# Patient Record
Sex: FEMALE | Race: WHITE | NOT HISPANIC OR LATINO | Employment: FULL TIME | URBAN - NONMETROPOLITAN AREA
[De-identification: names, ages, dates, MRNs, and addresses within clinical notes are randomized per-mention and may not be internally consistent; named-entity substitution may affect disease eponyms.]

---

## 2023-10-25 ENCOUNTER — OFFICE VISIT (OUTPATIENT)
Dept: NEUROSURGERY | Facility: CLINIC | Age: 73
End: 2023-10-25
Payer: OTHER MISCELLANEOUS

## 2023-10-25 VITALS — HEIGHT: 65 IN | BODY MASS INDEX: 26.16 KG/M2 | WEIGHT: 157 LBS

## 2023-10-25 DIAGNOSIS — E66.3 OVERWEIGHT WITH BODY MASS INDEX (BMI) OF 26 TO 26.9 IN ADULT: ICD-10-CM

## 2023-10-25 DIAGNOSIS — Z78.9 NON-SMOKER: ICD-10-CM

## 2023-10-25 DIAGNOSIS — M54.16 LUMBAR RADICULOPATHY: ICD-10-CM

## 2023-10-25 DIAGNOSIS — M46.1 SACROILIITIS: Primary | ICD-10-CM

## 2023-10-25 DIAGNOSIS — M43.16 SPONDYLOLISTHESIS OF LUMBAR REGION: ICD-10-CM

## 2023-10-25 PROCEDURE — 99204 OFFICE O/P NEW MOD 45 MIN: CPT | Performed by: PHYSICIAN ASSISTANT

## 2023-10-25 RX ORDER — ACETAMINOPHEN AND CODEINE PHOSPHATE 300; 30 MG/1; MG/1
1 TABLET ORAL EVERY 4 HOURS PRN
Qty: 60 TABLET | Refills: 0 | Status: SHIPPED | OUTPATIENT
Start: 2023-10-25

## 2023-10-25 NOTE — PATIENT INSTRUCTIONS
"PATIENT TO CONTINUE TO FOLLOW UP WITH HER PRIMARY CARE PROVIDER FOR YEARLY PHYSICAL EXAMS TO ENSURE COMPLETE HEALTH MAINTENANCE    BMI for Adults  What is BMI?  Body mass index (BMI) is a number that is calculated from a person's weight and height. BMI can help estimate how much of a person's weight is composed of fat. BMI does not measure body fat directly. Rather, it is an alternative to procedures that directly measure body fat, which can be difficult and expensive.  BMI can help identify people who may be at higher risk for certain medical problems.  What are BMI measurements used for?  BMI is used as a screening tool to identify possible weight problems. It helps determine whether a person is obese, overweight, a healthy weight, or underweight.  BMI is useful for:  Identifying a weight problem that may be related to a medical condition or may increase the risk for medical problems.  Promoting changes, such as changes in diet and exercise, to help reach a healthy weight. BMI screening can be repeated to see if these changes are working.  How is BMI calculated?  BMI involves measuring your weight in relation to your height. Both height and weight are measured, and the BMI is calculated from those numbers. This can be done either in English ([place] or metric measurements. Note that charts and online BMI calculators are available to help you find your BMI quickly and easily without having to do these calculations yourself.  To calculate your BMI in English ([place] measurements:    Measure your weight in pounds (lb).  Multiply the number of pounds by 703.  For example, for a person who weighs 180 lb, multiply that number by 703, which equals 126,540.  Measure your height in inches. Then multiply that number by itself to get a measurement called \"inches squared.\"  For example, for a person who is 70 inches tall, the \"inches squared\" measurement is 70 inches x 70 inches, which equals 4,900 inches squared.  Divide the " "total from step 2 (number of lb x 703) by the total from step 3 (inches squared): 126,540 ÷ 4,900 = 25.8. This is your BMI.  To calculate your BMI in metric measurements:  Measure your weight in kilograms (kg).  Measure your height in meters (m). Then multiply that number by itself to get a measurement called \"meters squared.\"  For example, for a person who is 1.75 m tall, the \"meters squared\" measurement is 1.75 m x 1.75 m, which is equal to 3.1 meters squared.  Divide the number of kilograms (your weight) by the meters squared number. In this example: 70 ÷ 3.1 = 22.6. This is your BMI.  What do the results mean?  BMI charts are used to identify whether you are underweight, normal weight, overweight, or obese. The following guidelines will be used:  Underweight: BMI less than 18.5.  Normal weight: BMI between 18.5 and 24.9.  Overweight: BMI between 25 and 29.9.  Obese: BMI of 30 or above.  Keep these notes in mind:  Weight includes both fat and muscle, so someone with a muscular build, such as an athlete, may have a BMI that is higher than 24.9. In cases like these, BMI is not an accurate measure of body fat.  To determine if excess body fat is the cause of a BMI of 25 or higher, further assessments may need to be done by a health care provider.  BMI is usually interpreted in the same way for men and women.  Where to find more information  For more information about BMI, including tools to quickly calculate your BMI, go to these websites:  Centers for Disease Control and Prevention: www.cdc.gov  American Heart Association: www.heart.org  National Heart, Lung, and Blood [place]: www.nhlbi.nih.gov  Summary  Body mass index (BMI) is a number that is calculated from a person's weight and height.  BMI may help estimate how much of a person's weight is composed of fat. BMI can help identify those who may be at higher risk for certain medical problems.  BMI can be measured using English measurements or metric " "measurements.  BMI charts are used to identify whether you are underweight, normal weight, overweight, or obese.  This information is not intended to replace advice given to you by your health care provider. Make sure you discuss any questions you have with your health care provider.  Document Revised: 05/31/2023 Document Reviewed: 07/17/2020  [employer] Patient Education © 2023 [employer]  DASH Eating Plan  DASH stands for Dietary Approaches to Stop Hypertension. The DASH eating plan is a healthy eating plan that has been shown to:  Reduce high blood pressure (hypertension).  Reduce your risk for type 2 diabetes, heart disease, and stroke.  Help with weight loss.  What are tips for following this plan?  Reading food labels  Check food labels for the amount of salt (sodium) per serving. Choose foods with less than 5 percent of the Daily Value of sodium. Generally, foods with less than 300 milligrams (mg) of sodium per serving fit into this eating plan.  To find whole grains, look for the word \"whole\" as the first word in the ingredient list.  Shopping  Buy products labeled as \"low-sodium\" or \"no salt added.\"  Buy fresh foods. Avoid canned foods and pre-made or frozen meals.  Cooking  Avoid adding salt when cooking. Use salt-free seasonings or herbs instead of table salt or sea salt. Check with your health care provider or pharmacist before using salt substitutes.  Do not warren foods. Cook foods using healthy methods such as baking, boiling, grilling, roasting, and broiling instead.  Cook with heart-healthy oils, such as olive, canola, avocado, soybean, or sunflower oil.  Meal planning    Eat a balanced diet that includes:  4 or more servings of fruits and 4 or more servings of vegetables each day. Try to fill one-half of your plate with fruits and vegetables.  6-8 servings of whole grains each day.  Less than 6 oz (170 g) of lean meat, poultry, or fish each day. A 3-oz (85-g) serving of meat is about the same size as " a deck of cards. One egg equals 1 oz (28 g).  2-3 servings of low-fat dairy each day. One serving is 1 cup (237 mL).  1 serving of nuts, seeds, or beans 5 times each week.  2-3 servings of heart-healthy fats. Healthy fats called omega-3 fatty acids are found in foods such as walnuts, flaxseeds, fortified milks, and eggs. These fats are also found in cold-water fish, such as sardines, salmon, and mackerel.  Limit how much you eat of:  Canned or prepackaged foods.  Food that is high in trans fat, such as some fried foods.  Food that is high in saturated fat, such as fatty meat.  Desserts and other sweets, sugary drinks, and other foods with added sugar.  Full-fat dairy products.  Do not salt foods before eating.  Do not eat more than 4 egg yolks a week.  Try to eat at least 2 vegetarian meals a week.  Eat more home-cooked food and less restaurant, buffet, and fast food.  Lifestyle  When eating at a restaurant, ask that your food be prepared with less salt or no salt, if possible.  If you drink alcohol:  Limit how much you use to:  0-1 drink a day for women who are not pregnant.  0-2 drinks a day for men.  Be aware of how much alcohol is in your drink. In the [place] one drink equals one 12 oz bottle of beer (355 mL), one 5 oz glass of wine (148 mL), or one 1½ oz glass of hard liquor (44 mL).  General information  Avoid eating more than 2,300 mg of salt a day. If you have hypertension, you may need to reduce your sodium intake to 1,500 mg a day.  Work with your health care provider to maintain a healthy body weight or to lose weight. Ask what an ideal weight is for you.  Get at least 30 minutes of exercise that causes your heart to beat faster (aerobic exercise) most days of the week. Activities may include walking, swimming, or biking.  Work with your health care provider or dietitian to adjust your eating plan to your individual calorie needs.  What foods should I eat?  Fruits  All fresh, dried, or frozen fruit. Canned  fruit in natural juice (without added sugar).  Vegetables  Fresh or frozen vegetables (raw, steamed, roasted, or grilled). Low-sodium or reduced-sodium tomato and vegetable juice. Low-sodium or reduced-sodium tomato sauce and tomato paste. Low-sodium or reduced-sodium canned vegetables.  Grains  Whole-grain or whole-wheat bread. Whole-grain or whole-wheat pasta. Brown rice. Oatmeal. Quinoa. Bulgur. Whole-grain and low-sodium cereals. Ashley bread. Low-fat, low-sodium crackers. Whole-wheat flour tortillas.  Meats and other proteins  Skinless chicken or turkey. Ground chicken or turkey. Pork with fat trimmed off. Fish and seafood. Egg whites. Dried beans, peas, or lentils. Unsalted nuts, nut butters, and seeds. Unsalted canned beans. Lean cuts of beef with fat trimmed off. Low-sodium, lean precooked or cured meat, such as sausages or meat loaves.  Dairy  Low-fat (1%) or fat-free (skim) milk. Reduced-fat, low-fat, or fat-free cheeses. Nonfat, low-sodium ricotta or cottage cheese. Low-fat or nonfat yogurt. Low-fat, low-sodium cheese.  Fats and oils  Soft margarine without trans fats. Vegetable oil. Reduced-fat, low-fat, or light mayonnaise and salad dressings (reduced-sodium). Canola, safflower, olive, avocado, soybean, and sunflower oils. Avocado.  Seasonings and condiments  Herbs. Spices. Seasoning mixes without salt.  Other foods  Unsalted popcorn and pretzels. Fat-free sweets.  The items listed above may not be a complete list of foods and beverages you can eat. Contact a dietitian for more information.  What foods should I avoid?  Fruits  Canned fruit in a light or heavy syrup. Fried fruit. Fruit in cream or butter sauce.  Vegetables  Creamed or fried vegetables. Vegetables in a cheese sauce. Regular canned vegetables (not low-sodium or reduced-sodium). Regular canned tomato sauce and paste (not low-sodium or reduced-sodium). Regular tomato and vegetable juice (not low-sodium or reduced-sodium). Pickles.  Olives.  Grains  Baked goods made with fat, such as croissants, muffins, or some breads. Dry pasta or rice meal packs.  Meats and other proteins  Fatty cuts of meat. Ribs. Fried meat. Sal. Bologna, salami, and other precooked or cured meats, such as sausages or meat loaves. Fat from the back of a pig (fatback). Bratwurst. Salted nuts and seeds. Canned beans with added salt. Canned or smoked fish. Whole eggs or egg yolks. Chicken or turkey with skin.  Dairy  Whole or 2% milk, cream, and half-and-half. Whole or full-fat cream cheese. Whole-fat or sweetened yogurt. Full-fat cheese. Nondairy creamers. Whipped toppings. Processed cheese and cheese spreads.  Fats and oils  Butter. Stick margarine. Lard. Shortening. Ghee. Sal fat. Tropical oils, such as coconut, palm kernel, or palm oil.  Seasonings and condiments  Onion salt, garlic salt, seasoned salt, table salt, and sea salt. [facility] sauce. Tartar sauce. Barbecue sauce. Teriyaki sauce. Soy sauce, including reduced-sodium. Steak sauce. Canned and packaged gravies. Fish sauce. Oyster sauce. Cocktail sauce. Store-bought horseradish. Ketchup. Mustard. Meat flavorings and tenderizers. Bouillon cubes. Hot sauces. Pre-made or packaged marinades. Pre-made or packaged taco seasonings. Relishes. Regular salad dressings.  Other foods  Salted popcorn and pretzels.  The items listed above may not be a complete list of foods and beverages you should avoid. Contact a dietitian for more information.  Where to find more information  National Heart, Lung, and Blood [place]: www.nhlbi.nih.gov  American Heart Association: www.heart.org  Academy of Nutrition and Dietetics: www.eatright.org  National Kidney Foundation: www.kidney.org  Summary  The DASH eating plan is a healthy eating plan that has been shown to reduce high blood pressure (hypertension). It may also reduce your risk for type 2 diabetes, heart disease, and stroke.  When on the DASH eating plan, aim to eat more  fresh fruits and vegetables, whole grains, lean proteins, low-fat dairy, and heart-healthy fats.  With the DASH eating plan, you should limit salt (sodium) intake to 2,300 mg a day. If you have hypertension, you may need to reduce your sodium intake to 1,500 mg a day.  Work with your health care provider or dietitian to adjust your eating plan to your individual calorie needs.  This information is not intended to replace advice given to you by your health care provider. Make sure you discuss any questions you have with your health care provider.  Document Revised: 11/20/2020 Document Reviewed: 11/20/2020  [employer] Patient Education © 2023 [employer]
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
## 2024-03-01 ENCOUNTER — OFFICE VISIT (OUTPATIENT)
Dept: NEUROSURGERY | Facility: CLINIC | Age: 74
End: 2024-03-01
Payer: OTHER MISCELLANEOUS

## 2024-03-01 VITALS — BODY MASS INDEX: 26.16 KG/M2 | HEIGHT: 65 IN | WEIGHT: 157 LBS

## 2024-03-01 DIAGNOSIS — Z78.9 NON-SMOKER: ICD-10-CM

## 2024-03-01 DIAGNOSIS — E66.3 OVERWEIGHT (BMI 25.0-29.9): Primary | ICD-10-CM
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
## 2024-04-29 ENCOUNTER — OFFICE VISIT (OUTPATIENT)
Dept: NEUROSURGERY | Facility: CLINIC | Age: 74
End: 2024-04-29
Payer: OTHER MISCELLANEOUS

## 2024-04-29 VITALS — WEIGHT: 157 LBS | HEIGHT: 65 IN | BODY MASS INDEX: 26.16 KG/M2

## 2024-04-29 DIAGNOSIS — Z78.9 NON-SMOKER: ICD-10-CM

## 2024-04-29 DIAGNOSIS — M43.16 SPONDYLOLISTHESIS OF LUMBAR REGION: ICD-10-CM

## 2024-04-29 DIAGNOSIS — E66.3 OVERWEIGHT: Primary | ICD-10-CM

## 2024-04-29 DIAGNOSIS — M46.1 SACROILIITIS: ICD-10-CM

## 2024-04-29 PROCEDURE — 99214 OFFICE O/P EST MOD 30 MIN: CPT | Performed by: NEUROLOGICAL SURGERY

## 2024-04-29 RX ORDER — AMLODIPINE BESYLATE 5 MG/1
TABLET ORAL
COMMUNITY
End: 2024-04-29 | Stop reason: SDUPTHER

## 2024-04-29 RX ORDER — ACETAMINOPHEN AND CODEINE PHOSPHATE 300; 30 MG/1; MG/1
1 TABLET ORAL EVERY 4 HOURS PRN
Qty: 24 TABLET | Refills: 0 | Status: SHIPPED | OUTPATIENT
Start: 2024-04-29 | End: 2024-05-05

## 2024-04-29 RX ORDER — NEBIVOLOL 5 MG/1
2.5 TABLET ORAL
COMMUNITY

## 2024-04-29 RX ORDER — HYDROCHLOROTHIAZIDE 25 MG/1
TABLET ORAL
COMMUNITY

## 2024-04-29 RX ORDER — HYDROCODONE BITARTRATE AND ACETAMINOPHEN 7.5; 325 MG/1; MG/1
TABLET ORAL EVERY 6 HOURS PRN
COMMUNITY
End: 2024-04-29

## 2024-04-29 RX ORDER — THYROID 120 MG/1
TABLET ORAL
COMMUNITY

## 2024-04-29 RX ORDER — GABAPENTIN 300 MG/1
300 CAPSULE ORAL 3 TIMES DAILY
Qty: 90 CAPSULE | Refills: 0 | Status: SHIPPED | OUTPATIENT
Start: 2024-04-29 | End: 2024-05-29

## 2024-04-29 NOTE — PATIENT INSTRUCTIONS
For more information:    Quit Now [place]  1-800-QUIT-NOW  https://[place].quitlogix.org/en-[place]/  Steps to Quit Smoking  Smoking tobacco can be harmful to your health and can affect almost every organ in your body. Smoking puts you, and those around you, at risk for developing many serious chronic diseases. Quitting smoking is difficult, but it is one of the best things that you can do for your health. It is never too late to quit.  What are the benefits of quitting smoking?  When you quit smoking, you lower your risk of developing serious diseases and conditions, such as:  Lung cancer or lung disease, such as COPD.  Heart disease.  Stroke.  Heart attack.  Infertility.  Osteoporosis and bone fractures.  Additionally, symptoms such as coughing, wheezing, and shortness of breath may get better when you quit. You may also find that you get sick less often because your body is stronger at fighting off colds and infections. If you are pregnant, quitting smoking can help to reduce your chances of having a baby of low birth weight.  How do I get ready to quit?  When you decide to quit smoking, create a plan to make sure that you are successful. Before you quit:  Pick a date to quit. Set a date within the next two weeks to give you time to prepare.  Write down the reasons why you are quitting. Keep this list in places where you will see it often, such as on your bathroom mirror or in your car or wallet.  Identify the people, places, things, and activities that make you want to smoke (triggers) and avoid them. Make sure to take these actions:  Throw away all cigarettes at home, at work, and in your car.  Throw away smoking accessories, such as ashtrays and lighters.  Clean your car and make sure to empty the ashtray.  Clean your home, including curtains and carpets.  Tell your family, friends, and coworkers that you are quitting. Support from your loved ones can make quitting easier.  Talk with your health care provider  about your options for quitting smoking.  Find out what treatment options are covered by your health insurance.  What strategies can I use to quit smoking?  Talk with your healthcare provider about different strategies to quit smoking. Some strategies include:  Quitting smoking altogether instead of gradually lessening how much you smoke over a period of time. Research shows that quitting “cold turkey” is more successful than gradually quitting.  Attending in-person counseling to help you build problem-solving skills. You are more likely to have success in quitting if you attend several counseling sessions. Even short sessions of 10 minutes can be effective.  Finding resources and support systems that can help you to quit smoking and remain smoke-free after you quit. These resources are most helpful when you use them often. They can include:  Online chats with a counselor.  Telephone quitlines.  Printed self-help materials.  Support groups or group counseling.  Text messaging programs.  Mobile phone applications.  Taking medicines to help you quit smoking. (If you are pregnant or breastfeeding, talk with your health care provider first.) Some medicines contain nicotine and some do not. Both types of medicines help with cravings, but the medicines that include nicotine help to relieve withdrawal symptoms. Your health care provider may recommend:  Nicotine patches, gum, or lozenges.  Nicotine inhalers or sprays.  Non-nicotine medicine that is taken by mouth.  Talk with your health care provider about combining strategies, such as taking medicines while you are also receiving in-person counseling. Using these two strategies together makes you more likely to succeed in quitting than if you used either strategy on its own.  If you are pregnant or breastfeeding, talk with your health care provider about finding counseling or other support strategies to quit smoking. Do not take medicine to help you quit smoking unless  told to do so by your health care provider.  What things can I do to make it easier to quit?  Quitting smoking might feel overwhelming at first, but there is a lot that you can do to make it easier. Take these important actions:  Reach out to your family and friends and ask that they support and encourage you during this time. Call telephone quitlines, reach out to support groups, or work with a counselor for support.  Ask people who smoke to avoid smoking around you.  Avoid places that trigger you to smoke, such as bars, parties, or smoke-break areas at work.  Spend time around people who do not smoke.  Lessen stress in your life, because stress can be a smoking trigger for some people. To lessen stress, try:  Exercising regularly.  Deep-breathing exercises.  Yoga.  Meditating.  Performing a body scan. This involves closing your eyes, scanning your body from head to toe, and noticing which parts of your body are particularly tense. Purposefully relax the muscles in those areas.  Download or purchase mobile phone or tablet apps (applications) that can help you stick to your quit plan by providing reminders, tips, and encouragement. There are many free apps, such as QuitGuide from the CDC (Centers for Disease Control and Prevention). You can find other support for quitting smoking (smoking cessation) through smokefree.gov and other websites.  How will I feel when I quit smoking?  Within the first 24 hours of quitting smoking, you may start to feel some withdrawal symptoms. These symptoms are usually most noticeable 2-3 days after quitting, but they usually do not last beyond 2-3 weeks. Changes or symptoms that you might experience include:  Mood swings.  Restlessness, anxiety, or irritation.  Difficulty concentrating.  Dizziness.  Strong cravings for sugary foods in addition to nicotine.  Mild weight gain.  Constipation.  Nausea.  Coughing or a sore throat.  Changes in how your medicines work in your body.  A  depressed mood.  Difficulty sleeping (insomnia).  After the first 2-3 weeks of quitting, you may start to notice more positive results, such as:  Improved sense of smell and taste.  Decreased coughing and sore throat.  Slower heart rate.  Lower blood pressure.  Clearer skin.  The ability to breathe more easily.  Fewer sick days.  Quitting smoking is very challenging for most people. Do not get discouraged if you are not successful the first time. Some people need to make many attempts to quit before they achieve long-term success. Do your best to stick to your quit plan, and talk with your health care provider if you have any questions or concerns.  This information is not intended to replace advice given to you by your health care provider. Make sure you discuss any questions you have with your health care provider.  Document Released: 12/12/2002 Document Revised: 08/15/2017 Document Reviewed: 05/03/2016  [employer] Interactive Patient Education © 2017 [employer]  For more information:    Quit Now [place]  1-800-QUIT-NOW  https://[place].quitlogix.org/en-[place]/  Steps to Quit Smoking  Smoking tobacco can be harmful to your health and can affect almost every organ in your body. Smoking puts you, and those around you, at risk for developing many serious chronic diseases. Quitting smoking is difficult, but it is one of the best things that you can do for your health. It is never too late to quit.  What are the benefits of quitting smoking?  When you quit smoking, you lower your risk of developing serious diseases and conditions, such as:  Lung cancer or lung disease, such as COPD.  Heart disease.  Stroke.  Heart attack.  Infertility.  Osteoporosis and bone fractures.  Additionally, symptoms such as coughing, wheezing, and shortness of breath may get better when you quit. You may also find that you get sick less often because your body is stronger at fighting off colds and infections. If you are pregnant, quitting  smoking can help to reduce your chances of having a baby of low birth weight.  How do I get ready to quit?  When you decide to quit smoking, create a plan to make sure that you are successful. Before you quit:  Pick a date to quit. Set a date within the next two weeks to give you time to prepare.  Write down the reasons why you are quitting. Keep this list in places where you will see it often, such as on your bathroom mirror or in your car or wallet.  Identify the people, places, things, and activities that make you want to smoke (triggers) and avoid them. Make sure to take these actions:  Throw away all cigarettes at home, at work, and in your car.  Throw away smoking accessories, such as ashtrays and lighters.  Clean your car and make sure to empty the ashtray.  Clean your home, including curtains and carpets.  Tell your family, friends, and coworkers that you are quitting. Support from your loved ones can make quitting easier.  Talk with your health care provider about your options for quitting smoking.  Find out what treatment options are covered by your health insurance.  What strategies can I use to quit smoking?  Talk with your healthcare provider about different strategies to quit smoking. Some strategies include:  Quitting smoking altogether instead of gradually lessening how much you smoke over a period of time. Research shows that quitting “cold turkey” is more successful than gradually quitting.  Attending in-person counseling to help you build problem-solving skills. You are more likely to have success in quitting if you attend several counseling sessions. Even short sessions of 10 minutes can be effective.  Finding resources and support systems that can help you to quit smoking and remain smoke-free after you quit. These resources are most helpful when you use them often. They can include:  Online chats with a counselor.  Telephone quitlines.  Printed self-help materials.  Support groups or group  counseling.  Text messaging programs.  Mobile phone applications.  Taking medicines to help you quit smoking. (If you are pregnant or breastfeeding, talk with your health care provider first.) Some medicines contain nicotine and some do not. Both types of medicines help with cravings, but the medicines that include nicotine help to relieve withdrawal symptoms. Your health care provider may recommend:  Nicotine patches, gum, or lozenges.  Nicotine inhalers or sprays.  Non-nicotine medicine that is taken by mouth.  Talk with your health care provider about combining strategies, such as taking medicines while you are also receiving in-person counseling. Using these two strategies together makes you more likely to succeed in quitting than if you used either strategy on its own.  If you are pregnant or breastfeeding, talk with your health care provider about finding counseling or other support strategies to quit smoking. Do not take medicine to help you quit smoking unless told to do so by your health care provider.  What things can I do to make it easier to quit?  Quitting smoking might feel overwhelming at first, but there is a lot that you can do to make it easier. Take these important actions:  Reach out to your family and friends and ask that they support and encourage you during this time. Call telephone quitlines, reach out to support groups, or work with a counselor for support.  Ask people who smoke to avoid smoking around you.  Avoid places that trigger you to smoke, such as bars, parties, or smoke-break areas at work.  Spend time around people who do not smoke.  Lessen stress in your life, because stress can be a smoking trigger for some people. To lessen stress, try:  Exercising regularly.  Deep-breathing exercises.  Yoga.  Meditating.  Performing a body scan. This involves closing your eyes, scanning your body from head to toe, and noticing which parts of your body are particularly tense. Purposefully relax  the muscles in those areas.  Download or purchase mobile phone or tablet apps (applications) that can help you stick to your quit plan by providing reminders, tips, and encouragement. There are many free apps, such as QuitGuide from the CDC (Centers for Disease Control and Prevention). You can find other support for quitting smoking (smoking cessation) through smokefree.gov and other websites.  How will I feel when I quit smoking?  Within the first 24 hours of quitting smoking, you may start to feel some withdrawal symptoms. These symptoms are usually most noticeable 2-3 days after quitting, but they usually do not last beyond 2-3 weeks. Changes or symptoms that you might experience include:  Mood swings.  Restlessness, anxiety, or irritation.  Difficulty concentrating.  Dizziness.  Strong cravings for sugary foods in addition to nicotine.  Mild weight gain.  Constipation.  Nausea.  Coughing or a sore throat.  Changes in how your medicines work in your body.  A depressed mood.  Difficulty sleeping (insomnia).  After the first 2-3 weeks of quitting, you may start to notice more positive results, such as:  Improved sense of smell and taste.  Decreased coughing and sore throat.  Slower heart rate.  Lower blood pressure.  Clearer skin.  The ability to breathe more easily.  Fewer sick days.  Quitting smoking is very challenging for most people. Do not get discouraged if you are not successful the first time. Some people need to make many attempts to quit before they achieve long-term success. Do your best to stick to your quit plan, and talk with your health care provider if you have any questions or concerns.  This information is not intended to replace advice given to you by your health care provider. Make sure you discuss any questions you have with your health care provider.  Document Released: 12/12/2002 Document Revised: 08/15/2017 Document Reviewed: 05/03/2016  [employer] Interactive Patient Education © 2017  [employer]

## 2024-04-29 NOTE — PROGRESS NOTES
"    Chief complaint:   Chief Complaint   Patient presents with    Follow-up     Follow up low back and right SI Joint pain        Subjective     HPI: I had a chance to see Roxann today in follow-up.  She did have an SI joint injection however she did not get immediate pain relief however approximately 3 days after the injection she is able to sleep better at night because it did help with this pain.  I do not think we are dealing with SI joint pain based on how she responded to the injections and she clearly has a spondylolisthesis at L4/5.  She is doing fairly well right now and therefore I would hold off on any intervention at least for now the how she does.    Review of Systems      Objective      Vital Signs  Ht 165.1 cm (65\")   Wt 71.2 kg (157 lb)   BMI 26.13 kg/m²     Physical Exam  Neurological:      Mental Status: She is oriented to person, place, and time.      Cranial Nerves: Cranial nerves 2-12 are intact.      Motor: Motor strength is normal.     Gait: Gait is intact.   Psychiatric:         Speech: Speech normal.         Neurologic Exam     Mental Status   Oriented to person, place, and time.   Attention: normal. Concentration: normal.   Speech: speech is normal   Level of consciousness: alert  Knowledge: good.   Normal comprehension.     Cranial Nerves   Cranial nerves II through XII intact.     Motor Exam     Strength   Strength 5/5 throughout.     Sensory Exam   Light touch normal.     Gait, Coordination, and Reflexes     Gait  Gait: normal      Imaging review:   MRI and flexion-extension films of the lumbar spine show a previous fusion L5/S1 with a spondylolisthesis at L4/5 with greater than 3 mm of movement on flexion and extension.        Assessment/Plan:   L4/5 spondylolisthesis    For the fact that she is doing quite well at this time I would like to see her back in 6 weeks to see how long these injections work for her.  Unfortunately nothing else is really helped and therefore if these do not " last we may have to consider a fusion at L4/5.  I look forward to seeing her at her next visit.    Patient is a nonsmoker  The patient's Body mass index is 26.13 kg/m².. BMI is above normal parameters. Recommendations include: continue with current weight loss program    Diagnoses and all orders for this visit:    1. Overweight (Primary)    2. Non-smoker    3. Sacroiliitis  -     acetaminophen-codeine (TYLENOL with CODEINE #3) 300-30 MG per tablet; Take 1 tablet by mouth Every 4 (Four) Hours As Needed for Moderate Pain for up to 6 days.  Dispense: 24 tablet; Refill: 0    4. Spondylolisthesis of lumbar region  -     acetaminophen-codeine (TYLENOL with CODEINE #3) 300-30 MG per tablet; Take 1 tablet by mouth Every 4 (Four) Hours As Needed for Moderate Pain for up to 6 days.  Dispense: 24 tablet; Refill: 0  -     gabapentin (NEURONTIN) 300 MG capsule; Take 1 capsule by mouth 3 (Three) Times a Day for 30 days.  Dispense: 90 capsule; Refill: 0        I discussed the patients findings and my recommendations with patient  Shine Enriquez DO  04/29/24  09:44 CDT

## 2024-06-04 ENCOUNTER — OFFICE VISIT (OUTPATIENT)
Dept: NEUROSURGERY | Facility: CLINIC | Age: 74
End: 2024-06-04
Payer: COMMERCIAL

## 2024-06-04 VITALS — BODY MASS INDEX: 26.16 KG/M2 | WEIGHT: 157 LBS | HEIGHT: 65 IN

## 2024-06-04 DIAGNOSIS — M43.16 SPONDYLOLISTHESIS OF LUMBAR REGION: ICD-10-CM

## 2024-06-04 DIAGNOSIS — E66.3 OVERWEIGHT (BMI 25.0-29.9): Primary | ICD-10-CM

## 2024-06-04 DIAGNOSIS — Z78.9 NON-SMOKER: ICD-10-CM

## 2024-06-04 PROCEDURE — 99214 OFFICE O/P EST MOD 30 MIN: CPT | Performed by: NEUROLOGICAL SURGERY

## 2024-06-04 NOTE — PROGRESS NOTES
"    Chief complaint:   Chief Complaint   Patient presents with    Follow-up     Follow up low back pain radiating into right hip        Subjective     HPI: I had a chance to see Roxann today in follow-up and unfortunately her pain is starting to return.  Based on how she reacted to the sacroiliac joint injection I think we are dealing with steroid effect as it took about 3 days for her to get any relief from the injection and if her SI joint was the main culprit we should have seen pain relief right away when the Marcaine was injected into the SI joint.  I do think it is most likely her L4/5 spondylolisthesis that is causing these problems.  She has tried physical therapy as well as injections and this has been going on for a year and a half now and I do think we are going to have to consider surgical intervention as the pain is getting quite severe    Review of Systems      Objective      Vital Signs  Ht 165.1 cm (65\")   Wt 71.2 kg (157 lb)   BMI 26.13 kg/m²     Physical Exam  Neurological:      Mental Status: She is oriented to person, place, and time.      Cranial Nerves: Cranial nerves 2-12 are intact.      Motor: Motor strength is normal.     Gait: Gait is intact.   Psychiatric:         Speech: Speech normal.         Neurologic Exam     Mental Status   Oriented to person, place, and time.   Attention: normal. Concentration: normal.   Speech: speech is normal   Level of consciousness: alert  Knowledge: good.   Normal comprehension.     Cranial Nerves   Cranial nerves II through XII intact.     Motor Exam     Strength   Strength 5/5 throughout.     Sensory Exam   Light touch normal.     Gait, Coordination, and Reflexes     Gait  Gait: normal      Imaging review:   MRI of the lumbar spine shows previous fusion at L5/S1 with a anterolisthesis at L4/5 with severe bilateral foraminal and lateral recess stenosis consistent with the patient's pain.        Assessment/Plan:   L4/5 spondylolisthesis with radiculopathy " and neurogenic claudication    Given the fact that the patient has been dealing with this for over a year and she is not responding to conservative management I did offer her a transforaminal lumbar interbody fusion at L4/5 to stabilize this level and hopefully give her some relief.  I did explain to her the risks and benefits of the procedure at length and she would like to proceed.  We will work on getting her medically cleared and on the operative schedule at our first opening.    Patient is a nonsmoker  The patient's Body mass index is 26.13 kg/m².. BMI is above normal parameters. Recommendations include: continue with current weight loss program    Diagnoses and all orders for this visit:    1. Overweight (BMI 25.0-29.9) (Primary)    2. Non-smoker    3. Spondylolisthesis of lumbar region  -     Case Request; Standing  -     ceFAZolin (ANCEF) 3,000 mg in sodium chloride 0.9 % 100 mL IVPB  -     Case Request    Other orders  -     Follow Anesthesia Guidelines / Protocol; Future  -     Follow Anesthesia Guidelines / Protocol; Standing  -     Verify NPO Status; Standing  -     SCD (Sequential Compression Device) - Place on Patient in Pre-Op; Standing  -     POC Glucose Once; Standing  -     Verify / Perform Chlorhexidine Skin Prep; Standing  -     Provide NPO Instructions to Patient; Future  -     Chlorhexidine Skin Prep; Future  -     Obtain Informed Consent; Future        I discussed the patients findings and my recommendations with patient  Shine Enriquez DO  06/04/24  10:27 CDT

## 2024-06-27 ENCOUNTER — TELEPHONE (OUTPATIENT)
Dept: NEUROSURGERY | Facility: CLINIC | Age: 74
End: 2024-06-27
Payer: COMMERCIAL

## 2024-06-27 NOTE — TELEPHONE ENCOUNTER
CALLED PT TO NOTIFY OF SURGERY ON 8/13/24. NO ANSWER, LEFT VOICE MAIL WITH DATES AND TIMES OF SURGERY AND PRE OP.     IF PT CALLS BACK OK TO TRANSFER TO [facility] IF NECESSARY.

## 2024-07-29 ENCOUNTER — PRE-ADMISSION TESTING (OUTPATIENT)
Dept: PREADMISSION TESTING | Facility: HOSPITAL | Age: 74
End: 2024-07-29
Payer: OTHER MISCELLANEOUS

## 2024-07-29 VITALS
WEIGHT: 160.05 LBS | HEIGHT: 64 IN | DIASTOLIC BLOOD PRESSURE: 78 MMHG | RESPIRATION RATE: 20 BRPM | HEART RATE: 72 BPM | SYSTOLIC BLOOD PRESSURE: 135 MMHG | BODY MASS INDEX: 27.33 KG/M2 | OXYGEN SATURATION: 97 %

## 2024-07-29 LAB
ANION GAP SERPL CALCULATED.3IONS-SCNC: 8 MMOL/L (ref 5–15)
BUN SERPL-MCNC: 13 MG/DL (ref 8–23)
BUN/CREAT SERPL: 20.3 (ref 7–25)
CALCIUM SPEC-SCNC: 9.4 MG/DL (ref 8.6–10.5)
CHLORIDE SERPL-SCNC: 103 MMOL/L (ref 98–107)
CO2 SERPL-SCNC: 27 MMOL/L (ref 22–29)
CREAT SERPL-MCNC: 0.64 MG/DL (ref 0.57–1)
DEPRECATED RDW RBC AUTO: 42.3 FL (ref 37–54)
EGFRCR SERPLBLD CKD-EPI 2021: 94 ML/MIN/1.73
ERYTHROCYTE [DISTWIDTH] IN BLOOD BY AUTOMATED COUNT: 12.8 % (ref 12.3–15.4)
GLUCOSE SERPL-MCNC: 133 MG/DL (ref 65–99)
HCT VFR BLD AUTO: 37.3 % (ref 34–46.6)
HGB BLD-MCNC: 12 G/DL (ref 12–15.9)
MCH RBC QN AUTO: 29.3 PG (ref 26.6–33)
MCHC RBC AUTO-ENTMCNC: 32.2 G/DL (ref 31.5–35.7)
MCV RBC AUTO: 91 FL (ref 79–97)
PLATELET # BLD AUTO: 337 10*3/MM3 (ref 140–450)
PMV BLD AUTO: 9.8 FL (ref 6–12)
POTASSIUM SERPL-SCNC: 4.3 MMOL/L (ref 3.5–5.2)
RBC # BLD AUTO: 4.1 10*6/MM3 (ref 3.77–5.28)
SODIUM SERPL-SCNC: 138 MMOL/L (ref 136–145)
WBC NRBC COR # BLD AUTO: 12.76 10*3/MM3 (ref 3.4–10.8)

## 2024-07-29 PROCEDURE — 85027 COMPLETE CBC AUTOMATED: CPT

## 2024-07-29 PROCEDURE — 93005 ELECTROCARDIOGRAM TRACING: CPT

## 2024-07-29 PROCEDURE — 36415 COLL VENOUS BLD VENIPUNCTURE: CPT

## 2024-07-29 PROCEDURE — 80048 BASIC METABOLIC PNL TOTAL CA: CPT

## 2024-07-29 RX ORDER — HYDROCODONE BITARTRATE AND ACETAMINOPHEN 5; 325 MG/1; MG/1
1 TABLET ORAL EVERY 8 HOURS PRN
COMMUNITY
Start: 2024-07-15

## 2024-07-29 RX ORDER — EMPAGLIFLOZIN 25 MG/1
1 TABLET, FILM COATED ORAL DAILY
COMMUNITY
Start: 2024-07-25 | End: 2024-10-23

## 2024-07-29 NOTE — DISCHARGE INSTRUCTIONS
Preparing for Surgery  Follow these instructions before the procedure:  Several days or weeks before your procedure      Ask your health care provider about:  Changing or stopping your regular medicines. This is especially important if you are taking diabetes medicines or blood thinners.  Taking medicines such as aspirin and ibuprofen. These medicines can thin your blood. Do not take these medicines unless your health care provider tells you to take them.  Taking over-the-counter medicines, vitamins, herbs, and supplements.    Contact your surgeon if you:  Develop a fever of more than 100.4°F (38°C) or other feelings of illness during the 48 hours before your surgery.  Have symptoms that get worse.  Have questions or concerns about your surgery.  If you are going home the same day of your surgery you will need to arrange for a responsible adult, age 18 years old or older, to drive you home from the hospital and stay with you for 24 hours. Verification of the  will be made prior to any procedure requiring sedation. You may not go home in a taxi or any form of public transportation by yourself.     Day before your procedure  Medication(s) you need to stop the day before your surgery:DO NOT TAKE LISINOPRIL FOR 24 HOURS PRIOR TO SURGERY    24 hours before your procedure DO NOT drink alcoholic beverages or smoke.  24 hours before your procedure STOP taking Erectile Dysfunction medication (i.e.,Cialis, Viagra)   You may be asked to shower with a germ-killing soap.  Day of your procedure   You may take the following medication(s) the morning of surgery with a sip of water: GABAPENTIN, METOPROLOL, OMEPRAZOLE, HYDROCODONE (NORCO)      8 hours before your procedure STOP all food, any dairy products, and full liquids. This includes hard candy, chewing gum or mints. This is extremely important to prevent serious complications.     Up to 2 hours before your scheduled arrival time, you may have clear liquids no cream,  powder, or pulp of any kind. Safe options are water, black coffee, plain tea, soda, Gatorade/Powerade, clear broth, apple juice.    2 hours before your scheduled arrival time, STOP drinking clear liquids.    You may need to take another shower with a germ-killing soap before you leave home in the morning. Do not use perfumes, colognes, or body lotions.  Wear comfortable loose-fitting clothing.  Remove all jewelry including body piercing and rings, dark colored nail polish, and make up prior to arrival at the hospital. Leave all valuables at home.   Bring your hearing aids if you rely on them.  Do not wear contact lenses. If you wear eyeglasses remember to bring a case to store them in while you are in surgery.  Do not use denture adhesives since you will be asked to remove them during your surgery.    You do not need to bring your home medications into the hospital.   Bring your sleep apnea device with you on the day of your surgery (if this applies to you).  If you wear portable oxygen, bring it with you.   If you are staying overnight, you may bring a bag of items you may need such as slippers, robe and a change of clothes for your discharge. You may want to leave these items in the car until you are ready for them since your family will take your belongings when you leave the pre-operative area.  Arrive at the hospital as scheduled by the office. You will be asked to arrive 2 hours prior to your surgery time in order to prepare for your procedure.  When you arrive at the hospital  Go to the registration desk located at the main entrance of the hospital.  After registration is completed, you will be given a beeper and a sticker sheet. Take the stickers to Outpatient Surgery and place in the tray at the end of the desk to notify the staff that you have arrived and registered.   Return to the lobby to wait. You are not always called back according to the time of arrival but rather the time your doctor will be  ready.  When your beeper lights up and vibrates proceed through the double doors, under the stairs, and a member of the Outpatient Surgery staff will escort you to your preoperative room.     How to Use Chlorhexidine Before Surgery  Chlorhexidine gluconate (CHG) is a germ-killing (antiseptic) solution that is used to clean the skin. It can get rid of the bacteria that normally live on the skin and can keep them away for about 24 hours. To clean your skin with CHG, you may be given:  A CHG solution to use in the shower or as part of a sponge bath.  A prepackaged cloth that contains CHG.  Cleaning your skin with CHG may help lower the risk for infection:  While you are staying in the intensive care unit of the hospital.  If you have a vascular access, such as a central line, to provide short-term or long-term access to your veins.  If you have a catheter to drain urine from your bladder.  If you are on a ventilator. A ventilator is a machine that helps you breathe by moving air in and out of your lungs.  After surgery.  What are the risks?  Risks of using CHG include:  A skin reaction.  Hearing loss, if CHG gets in your ears and you have a perforated eardrum.  Eye injury, if CHG gets in your eyes and is not rinsed out.  The CHG product catching fire.  Make sure that you avoid smoking and flames after applying CHG to your skin.  Do not use CHG:  If you have a chlorhexidine allergy or have previously reacted to chlorhexidine.  On babies younger than 2 months of age.  How to use CHG solution  Use CHG only as told by your health care provider, and follow the instructions on the label.  Use the full amount of CHG as directed. Usually, this is one bottle.  During a shower    Follow these steps when using CHG solution during a shower (unless your health care provider gives you different instructions):  Start the shower.  Use your normal soap and shampoo to wash your face and hair.  Turn off the shower or move out of the  shower stream.  Pour the CHG onto a clean washcloth. Do not use any type of brush or rough-edged sponge.  Starting at your neck, lather your body down to your toes. Make sure you follow these instructions:  If you will be having surgery, pay special attention to the part of your body where you will be having surgery. Scrub this area for at least 1 minute.  Do not use CHG on your head or face. If the solution gets into your ears or eyes, rinse them well with water.  Avoid your genital area.  Avoid any areas of skin that have broken skin, cuts, or scrapes.  Scrub your back and under your arms. Make sure to wash skin folds.  Let the lather sit on your skin for 1-2 minutes or as long as told by your health care provider.  Thoroughly rinse your entire body in the shower. Make sure that all body creases and crevices are rinsed well.  Dry off with a clean towel. Do not put any substances on your body afterward--such as powder, lotion, or perfume--unless you are told to do so by your health care provider. Only use lotions that are recommended by the .  Put on clean clothes or pajamas.  If it is the night before your surgery, sleep in clean sheets.     During a sponge bath  Follow these steps when using CHG solution during a sponge bath (unless your health care provider gives you different instructions):  Use your normal soap and shampoo to wash your face and hair.  Pour the CHG onto a clean washcloth.  Starting at your neck, lather your body down to your toes. Make sure you follow these instructions:  If you will be having surgery, pay special attention to the part of your body where you will be having surgery. Scrub this area for at least 1 minute.  Do not use CHG on your head or face. If the solution gets into your ears or eyes, rinse them well with water.  Avoid your genital area.  Avoid any areas of skin that have broken skin, cuts, or scrapes.  Scrub your back and under your arms. Make sure to wash skin  folds.  Let the lather sit on your skin for 1-2 minutes or as long as told by your health care provider.  Using a different clean, wet washcloth, thoroughly rinse your entire body. Make sure that all body creases and crevices are rinsed well.  Dry off with a clean towel. Do not put any substances on your body afterward--such as powder, lotion, or perfume--unless you are told to do so by your health care provider. Only use lotions that are recommended by the .  Put on clean clothes or pajamas.  If it is the night before your surgery, sleep in clean sheets.  How to use CHG prepackaged cloths  Only use CHG cloths as told by your health care provider, and follow the instructions on the label.  Use the CHG cloth on clean, dry skin.  Do not use the CHG cloth on your head or face unless your health care provider tells you to.  When washing with the CHG cloth:  Avoid your genital area.  Avoid any areas of skin that have broken skin, cuts, or scrapes.  Before surgery    Follow these steps when using a CHG cloth to clean before surgery (unless your health care provider gives you different instructions):  Using the CHG cloth, vigorously scrub the part of your body where you will be having surgery. Scrub using a back-and-forth motion for 3 minutes. The area on your body should be completely wet with CHG when you are done scrubbing.  Do not rinse. Discard the cloth and let the area air-dry. Do not put any substances on the area afterward, such as powder, lotion, or perfume.  Put on clean clothes or pajamas.  If it is the night before your surgery, sleep in clean sheets.     For general bathing  Follow these steps when using CHG cloths for general bathing (unless your health care provider gives you different instructions).  Use a separate CHG cloth for each area of your body. Make sure you wash between any folds of skin and between your fingers and toes. Wash your body in the following order, switching to a new cloth  after each step:  The front of your neck, shoulders, and chest.  Both of your arms, under your arms, and your hands.  Your stomach and groin area, avoiding the genitals.  Your right leg and foot.  Your left leg and foot.  The back of your neck, your back, and your buttocks.  Do not rinse. Discard the cloth and let the area air-dry. Do not put any substances on your body afterward--such as powder, lotion, or perfume--unless you are told to do so by your health care provider. Only use lotions that are recommended by the .  Put on clean clothes or pajamas.  Contact a health care provider if:  Your skin gets irritated after scrubbing.  You have questions about using your solution or cloth.  You swallow any chlorhexidine. Call your local poison control center (1-345.817.2681 in the [place]  Get help right away if:  Your eyes itch badly, or they become very red or swollen.  Your skin itches badly and is red or swollen.  Your hearing changes.  You have trouble seeing.  You have swelling or tingling in your mouth or throat.  You have trouble breathing.  These symptoms may represent a serious problem that is an emergency. Do not wait to see if the symptoms will go away. Get medical help right away. Call your local emergency services (246 in the [place] Do not drive yourself to the hospital.  Summary  Chlorhexidine gluconate (CHG) is a germ-killing (antiseptic) solution that is used to clean the skin. Cleaning your skin with CHG may help to lower your risk for infection.  You may be given CHG to use for bathing. It may be in a bottle or in a prepackaged cloth to use on your skin. Carefully follow your health care provider's instructions and the instructions on the product label.  Do not use CHG if you have a chlorhexidine allergy.  Contact your health care provider if your skin gets irritated after scrubbing.  This information is not intended to replace advice given to you by your health care provider. Make sure you  discuss any questions you have with your health care provider.  Document Revised: 04/17/2023 Document Reviewed: 02/28/2022  [employer]vier Patient Education © 2023 [employer]

## 2024-08-02 LAB
QT INTERVAL: 382 MS
QTC INTERVAL: 424 MS

## 2024-08-09 ENCOUNTER — TELEPHONE (OUTPATIENT)
Dept: NEUROSURGERY | Facility: CLINIC | Age: 74
End: 2024-08-09
Payer: COMMERCIAL

## 2024-08-09 NOTE — TELEPHONE ENCOUNTER
Called and spoke with pt and let her know that her surgery has been cancelled for 8/13/24 at this time as we are still waiting on the workers comp authorization. Pt voiced understanding.

## 2024-09-20 ENCOUNTER — TELEPHONE (OUTPATIENT)
Dept: NEUROSURGERY | Facility: CLINIC | Age: 74
End: 2024-09-20
Payer: COMMERCIAL

## 2024-10-21 ENCOUNTER — PRE-ADMISSION TESTING (OUTPATIENT)
Dept: PREADMISSION TESTING | Facility: HOSPITAL | Age: 74
End: 2024-10-21
Payer: OTHER MISCELLANEOUS

## 2024-10-21 VITALS
RESPIRATION RATE: 16 BRPM | SYSTOLIC BLOOD PRESSURE: 118 MMHG | BODY MASS INDEX: 28.4 KG/M2 | OXYGEN SATURATION: 95 % | HEART RATE: 67 BPM | WEIGHT: 160.27 LBS | DIASTOLIC BLOOD PRESSURE: 66 MMHG | HEIGHT: 63 IN

## 2024-10-21 LAB
ANION GAP SERPL CALCULATED.3IONS-SCNC: 10 MMOL/L (ref 5–15)
BUN SERPL-MCNC: 17 MG/DL (ref 8–23)
BUN/CREAT SERPL: 17.3 (ref 7–25)
CALCIUM SPEC-SCNC: 9.3 MG/DL (ref 8.6–10.5)
CHLORIDE SERPL-SCNC: 100 MMOL/L (ref 98–107)
CO2 SERPL-SCNC: 29 MMOL/L (ref 22–29)
CREAT SERPL-MCNC: 0.98 MG/DL (ref 0.57–1)
DEPRECATED RDW RBC AUTO: 42.6 FL (ref 37–54)
EGFRCR SERPLBLD CKD-EPI 2021: 61.5 ML/MIN/1.73
ERYTHROCYTE [DISTWIDTH] IN BLOOD BY AUTOMATED COUNT: 12.8 % (ref 12.3–15.4)
GLUCOSE SERPL-MCNC: 196 MG/DL (ref 65–99)
HCT VFR BLD AUTO: 39.3 % (ref 34–46.6)
HGB BLD-MCNC: 12.7 G/DL (ref 12–15.9)
MCH RBC QN AUTO: 29.1 PG (ref 26.6–33)
MCHC RBC AUTO-ENTMCNC: 32.3 G/DL (ref 31.5–35.7)
MCV RBC AUTO: 90.1 FL (ref 79–97)
PLATELET # BLD AUTO: 288 10*3/MM3 (ref 140–450)
PMV BLD AUTO: 9.9 FL (ref 6–12)
POTASSIUM SERPL-SCNC: 4.4 MMOL/L (ref 3.5–5.2)
RBC # BLD AUTO: 4.36 10*6/MM3 (ref 3.77–5.28)
SODIUM SERPL-SCNC: 139 MMOL/L (ref 136–145)
WBC NRBC COR # BLD AUTO: 12.12 10*3/MM3 (ref 3.4–10.8)

## 2024-10-21 PROCEDURE — 85027 COMPLETE CBC AUTOMATED: CPT

## 2024-10-21 PROCEDURE — 80048 BASIC METABOLIC PNL TOTAL CA: CPT

## 2024-10-21 PROCEDURE — 36415 COLL VENOUS BLD VENIPUNCTURE: CPT

## 2024-10-21 NOTE — DISCHARGE INSTRUCTIONS
Preparing for Surgery  Follow these instructions before the procedure:  Several days or weeks before your procedure      Ask your health care provider about:  Changing or stopping your regular medicines. This is especially important if you are taking diabetes medicines or blood thinners.  Taking medicines such as aspirin and ibuprofen. These medicines can thin your blood. Do not take these medicines unless your health care provider tells you to take them.  Taking over-the-counter medicines, vitamins, herbs, and supplements.    Contact your surgeon if you:  Develop a fever of more than 100.4°F (38°C) or other feelings of illness during the 48 hours before your surgery.  Have symptoms that get worse.  Have questions or concerns about your surgery.  If you are going home the same day of your surgery you will need to arrange for a responsible adult, age 18 years old or older, to drive you home from the hospital and stay with you for 24 hours. Verification of the  will be made prior to any procedure requiring sedation. You may not go home in a taxi or any form of public transportation by yourself.     Day before your procedure  Medication(s) you need to stop the day before your surgery:LISINOPRIL    24 hours before your procedure DO NOT drink alcoholic beverages or smoke.  24 hours before your procedure STOP taking Erectile Dysfunction medication (i.e.,Cialis, Viagra)   You may be asked to shower with a germ-killing soap.  Day of your procedure   You may take the following medication(s) the morning of surgery with a sip of water: OMEPRAZOLE, LOPRESSOR, GABAPENTIN      8 hours before your scheduled arrival time, STOP all food, any dairy products, and full liquids. This includes hard candy, chewing gum or mints. This is extremely important to prevent serious complications.     Up to 2 hours before your scheduled arrival time, you may have clear liquids no cream, powder, or pulp of any kind. Safe options are water,  black coffee, plain tea, soda, Gatorade/Powerade, clear broth, apple juice.    2 hours before your scheduled arrival time, STOP drinking clear liquids.    You may need to take another shower with a germ-killing soap before you leave home in the morning. Do not use perfumes, colognes, or body lotions.  Wear comfortable loose-fitting clothing.  Remove all jewelry including body piercing and rings, dark colored nail polish, and make up prior to arrival at the hospital. Leave all valuables at home.   Bring your hearing aids if you rely on them.  Do not wear contact lenses. If you wear eyeglasses remember to bring a case to store them in while you are in surgery.  Do not use denture adhesives since you will be asked to remove them during your surgery.    You do not need to bring your home medications into the hospital.   Bring your sleep apnea device with you on the day of your surgery (if this applies to you).  If you wear portable oxygen, bring it with you.   If you are staying overnight, you may bring a bag of items you may need such as slippers, robe and a change of clothes for your discharge. You may want to leave these items in the car until you are ready for them since your family will take your belongings when you leave the pre-operative area.  Arrive at the hospital as scheduled by the office. You will be asked to arrive 2 hours prior to your surgery time in order to prepare for your procedure.  When you arrive at the hospital  Go to the registration desk located at the main entrance of the hospital.  After registration is completed, you will be given a beeper and a sticker sheet. Take the stickers to Outpatient Surgery and place in the tray at the end of the desk to notify the staff that you have arrived and registered.   Return to the lobby to wait. You are not always called back according to the time of arrival but rather the time your doctor will be ready.  When your beeper lights up and vibrates proceed  through the double doors, under the stairs, and a member of the Outpatient Surgery staff will escort you to your preoperative room.   How to Use Chlorhexidine Before Surgery  Chlorhexidine gluconate (CHG) is a germ-killing (antiseptic) solution that is used to clean the skin. It can get rid of the bacteria that normally live on the skin and can keep them away for about 24 hours. To clean your skin with CHG, you may be given:  A CHG solution to use in the shower or as part of a sponge bath.  A prepackaged cloth that contains CHG.  Cleaning your skin with CHG may help lower the risk for infection:  While you are staying in the intensive care unit of the hospital.  If you have a vascular access, such as a central line, to provide short-term or long-term access to your veins.  If you have a catheter to drain urine from your bladder.  If you are on a ventilator. A ventilator is a machine that helps you breathe by moving air in and out of your lungs.  After surgery.  What are the risks?  Risks of using CHG include:  A skin reaction.  Hearing loss, if CHG gets in your ears and you have a perforated eardrum.  Eye injury, if CHG gets in your eyes and is not rinsed out.  The CHG product catching fire.  Make sure that you avoid smoking and flames after applying CHG to your skin.  Do not use CHG:  If you have a chlorhexidine allergy or have previously reacted to chlorhexidine.  On babies younger than 2 months of age.  How to use CHG solution  Use CHG only as told by your health care provider, and follow the instructions on the label.  Use the full amount of CHG as directed. Usually, this is one bottle.  During a shower    Follow these steps when using CHG solution during a shower (unless your health care provider gives you different instructions):  Start the shower.  Use your normal soap and shampoo to wash your face and hair.  Turn off the shower or move out of the shower stream.  Pour the CHG onto a clean washcloth. Do not  use any type of brush or rough-edged sponge.  Starting at your neck, lather your body down to your toes. Make sure you follow these instructions:  If you will be having surgery, pay special attention to the part of your body where you will be having surgery. Scrub this area for at least 1 minute.  Do not use CHG on your head or face. If the solution gets into your ears or eyes, rinse them well with water.  Avoid your genital area.  Avoid any areas of skin that have broken skin, cuts, or scrapes.  Scrub your back and under your arms. Make sure to wash skin folds.  Let the lather sit on your skin for 1-2 minutes or as long as told by your health care provider.  Thoroughly rinse your entire body in the shower. Make sure that all body creases and crevices are rinsed well.  Dry off with a clean towel. Do not put any substances on your body afterward--such as powder, lotion, or perfume--unless you are told to do so by your health care provider. Only use lotions that are recommended by the .  Put on clean clothes or pajamas.  If it is the night before your surgery, sleep in clean sheets.     During a sponge bath  Follow these steps when using CHG solution during a sponge bath (unless your health care provider gives you different instructions):  Use your normal soap and shampoo to wash your face and hair.  Pour the CHG onto a clean washcloth.  Starting at your neck, lather your body down to your toes. Make sure you follow these instructions:  If you will be having surgery, pay special attention to the part of your body where you will be having surgery. Scrub this area for at least 1 minute.  Do not use CHG on your head or face. If the solution gets into your ears or eyes, rinse them well with water.  Avoid your genital area.  Avoid any areas of skin that have broken skin, cuts, or scrapes.  Scrub your back and under your arms. Make sure to wash skin folds.  Let the lather sit on your skin for 1-2 minutes or as  long as told by your health care provider.  Using a different clean, wet washcloth, thoroughly rinse your entire body. Make sure that all body creases and crevices are rinsed well.  Dry off with a clean towel. Do not put any substances on your body afterward--such as powder, lotion, or perfume--unless you are told to do so by your health care provider. Only use lotions that are recommended by the .  Put on clean clothes or pajamas.  If it is the night before your surgery, sleep in clean sheets.  How to use CHG prepackaged cloths  Only use CHG cloths as told by your health care provider, and follow the instructions on the label.  Use the CHG cloth on clean, dry skin.  Do not use the CHG cloth on your head or face unless your health care provider tells you to.  When washing with the CHG cloth:  Avoid your genital area.  Avoid any areas of skin that have broken skin, cuts, or scrapes.  Before surgery    Follow these steps when using a CHG cloth to clean before surgery (unless your health care provider gives you different instructions):  Using the CHG cloth, vigorously scrub the part of your body where you will be having surgery. Scrub using a back-and-forth motion for 3 minutes. The area on your body should be completely wet with CHG when you are done scrubbing.  Do not rinse. Discard the cloth and let the area air-dry. Do not put any substances on the area afterward, such as powder, lotion, or perfume.  Put on clean clothes or pajamas.  If it is the night before your surgery, sleep in clean sheets.     For general bathing  Follow these steps when using CHG cloths for general bathing (unless your health care provider gives you different instructions).  Use a separate CHG cloth for each area of your body. Make sure you wash between any folds of skin and between your fingers and toes. Wash your body in the following order, switching to a new cloth after each step:  The front of your neck, shoulders, and  chest.  Both of your arms, under your arms, and your hands.  Your stomach and groin area, avoiding the genitals.  Your right leg and foot.  Your left leg and foot.  The back of your neck, your back, and your buttocks.  Do not rinse. Discard the cloth and let the area air-dry. Do not put any substances on your body afterward--such as powder, lotion, or perfume--unless you are told to do so by your health care provider. Only use lotions that are recommended by the .  Put on clean clothes or pajamas.  Contact a health care provider if:  Your skin gets irritated after scrubbing.  You have questions about using your solution or cloth.  You swallow any chlorhexidine. Call your local poison control center (1-382.161.7364 in the [place]  Get help right away if:  Your eyes itch badly, or they become very red or swollen.  Your skin itches badly and is red or swollen.  Your hearing changes.  You have trouble seeing.  You have swelling or tingling in your mouth or throat.  You have trouble breathing.  These symptoms may represent a serious problem that is an emergency. Do not wait to see if the symptoms will go away. Get medical help right away. Call your local emergency services (998 in the [place] Do not drive yourself to the hospital.  Summary  Chlorhexidine gluconate (CHG) is a germ-killing (antiseptic) solution that is used to clean the skin. Cleaning your skin with CHG may help to lower your risk for infection.  You may be given CHG to use for bathing. It may be in a bottle or in a prepackaged cloth to use on your skin. Carefully follow your health care provider's instructions and the instructions on the product label.  Do not use CHG if you have a chlorhexidine allergy.  Contact your health care provider if your skin gets irritated after scrubbing.  This information is not intended to replace advice given to you by your health care provider. Make sure you discuss any questions you have with your health care  provider.  Document Revised: 04/17/2023 Document Reviewed: 02/28/2022  [employer]vier Patient Education © 2023 [employer]

## 2024-11-12 ENCOUNTER — ANESTHESIA (OUTPATIENT)
Dept: PERIOP | Facility: HOSPITAL | Age: 74
End: 2024-11-12
Payer: OTHER MISCELLANEOUS

## 2024-11-12 ENCOUNTER — APPOINTMENT (OUTPATIENT)
Dept: GENERAL RADIOLOGY | Facility: HOSPITAL | Age: 74
End: 2024-11-12
Payer: OTHER MISCELLANEOUS

## 2024-11-12 ENCOUNTER — ANESTHESIA EVENT (OUTPATIENT)
Dept: PERIOP | Facility: HOSPITAL | Age: 74
End: 2024-11-12
Payer: OTHER MISCELLANEOUS

## 2024-11-12 ENCOUNTER — HOSPITAL ENCOUNTER (OUTPATIENT)
Facility: HOSPITAL | Age: 74
Discharge: HOME | End: 2024-11-14
Attending: NEUROLOGICAL SURGERY | Admitting: NEUROLOGICAL SURGERY
Payer: OTHER MISCELLANEOUS

## 2024-11-12 DIAGNOSIS — M43.16 SPONDYLOLISTHESIS OF LUMBAR REGION: ICD-10-CM

## 2024-11-12 DIAGNOSIS — Z74.09 IMPAIRED MOBILITY: Primary | ICD-10-CM

## 2024-11-12 PROBLEM — M43.10 SPONDYLOLISTHESIS: Status: ACTIVE | Noted: 2024-11-12

## 2024-11-12 LAB
GLUCOSE BLDC GLUCOMTR-MCNC: 178 MG/DL (ref 70–130)
GLUCOSE BLDC GLUCOMTR-MCNC: 215 MG/DL (ref 70–130)
GLUCOSE BLDC GLUCOMTR-MCNC: 218 MG/DL (ref 70–130)
GLUCOSE BLDC GLUCOMTR-MCNC: 263 MG/DL (ref 70–130)
GLUCOSE BLDC GLUCOMTR-MCNC: 266 MG/DL (ref 70–130)

## 2024-11-12 PROCEDURE — 25010000002 MIDAZOLAM PER 1MG: Performed by: ANESTHESIOLOGY

## 2024-11-12 PROCEDURE — C1713 ANCHOR/SCREW BN/BN,TIS/BN: HCPCS | Performed by: NEUROLOGICAL SURGERY

## 2024-11-12 PROCEDURE — 25010000002 BUPIVACAINE 0.25 % SOLUTION: Performed by: NEUROLOGICAL SURGERY

## 2024-11-12 PROCEDURE — 25010000002 ONDANSETRON PER 1 MG: Performed by: NURSE ANESTHETIST, CERTIFIED REGISTERED

## 2024-11-12 PROCEDURE — G0378 HOSPITAL OBSERVATION PER HR: HCPCS

## 2024-11-12 PROCEDURE — C1769 GUIDE WIRE: HCPCS | Performed by: NEUROLOGICAL SURGERY

## 2024-11-12 PROCEDURE — 63710000001 INSULIN REGULAR HUMAN PER 5 UNITS: Performed by: ANESTHESIOLOGY

## 2024-11-12 PROCEDURE — 72100 X-RAY EXAM L-S SPINE 2/3 VWS: CPT

## 2024-11-12 PROCEDURE — 25010000002 LIDOCAINE PF 2% 2 % SOLUTION: Performed by: NURSE ANESTHETIST, CERTIFIED REGISTERED

## 2024-11-12 PROCEDURE — 25810000003 LACTATED RINGERS PER 1000 ML: Performed by: NEUROLOGICAL SURGERY

## 2024-11-12 PROCEDURE — 63052 LAM FACETC/FRMT ARTHRD LUM 1: CPT | Performed by: NEUROLOGICAL SURGERY

## 2024-11-12 PROCEDURE — 22853 INSJ BIOMECHANICAL DEVICE: CPT | Performed by: NEUROLOGICAL SURGERY

## 2024-11-12 PROCEDURE — 25010000002 DROPERIDOL PER 5 MG: Performed by: ANESTHESIOLOGY

## 2024-11-12 PROCEDURE — 25010000002 KETOROLAC TROMETHAMINE PER 15 MG: Performed by: NEUROLOGICAL SURGERY

## 2024-11-12 PROCEDURE — 25010000002 CEFAZOLIN PER 500 MG: Performed by: NEUROLOGICAL SURGERY

## 2024-11-12 PROCEDURE — 25010000002 PROPOFOL 10 MG/ML EMULSION: Performed by: NURSE ANESTHETIST, CERTIFIED REGISTERED

## 2024-11-12 PROCEDURE — 22840 INSERT SPINE FIXATION DEVICE: CPT | Performed by: NEUROLOGICAL SURGERY

## 2024-11-12 PROCEDURE — 25010000002 VANCOMYCIN 1 G RECONSTITUTED SOLUTION 1 EACH VIAL: Performed by: NEUROLOGICAL SURGERY

## 2024-11-12 PROCEDURE — 82948 REAGENT STRIP/BLOOD GLUCOSE: CPT

## 2024-11-12 PROCEDURE — 22630 ARTHRD PST TQ 1NTRSPC LUM: CPT | Performed by: NEUROLOGICAL SURGERY

## 2024-11-12 PROCEDURE — 76000 FLUOROSCOPY <1 HR PHYS/QHP: CPT

## 2024-11-12 PROCEDURE — 25010000002 FENTANYL CITRATE (PF) 250 MCG/5ML SOLUTION: Performed by: NURSE ANESTHETIST, CERTIFIED REGISTERED

## 2024-11-12 PROCEDURE — 25010000002 FENTANYL CITRATE (PF) 50 MCG/ML SOLUTION: Performed by: ANESTHESIOLOGY

## 2024-11-12 PROCEDURE — 25010000002 HYDROMORPHONE PER 4 MG: Performed by: ANESTHESIOLOGY

## 2024-11-12 DEVICE — SCREW 54840017545 CN MAS 7.5X45 CC
Type: IMPLANTABLE DEVICE | Site: SPINE LUMBAR | Status: FUNCTIONAL
Brand: CD HORIZON® SPINAL SYSTEM

## 2024-11-12 DEVICE — SPACER 84333414 ADAPTIX 34MM X 14MM
Type: IMPLANTABLE DEVICE | Site: SPINE LUMBAR | Status: FUNCTIONAL
Brand: ADAPTIX™ INTERBODY SYSTEM WITH TITAN NANOLOCK™ SURFACE TECHNOLOGY

## 2024-11-12 DEVICE — DBM T43103 2.5CC GRAFTON PUTTY
Type: IMPLANTABLE DEVICE | Site: SPINE LUMBAR | Status: FUNCTIONAL
Brand: GRAFTON®AND GRAFTON PLUS®DEMINERALIZED BONE MATRIX (DBM)

## 2024-11-12 DEVICE — HEMOST ABS SURGIFOAM SZ100 8X12 10MM: Type: IMPLANTABLE DEVICE | Site: SPINE LUMBAR | Status: FUNCTIONAL

## 2024-11-12 DEVICE — BIOLOGIC 7600110 85 BTCP 15 HA 10CC VIAL
Type: IMPLANTABLE DEVICE | Site: SPINE LUMBAR | Status: FUNCTIONAL
Brand: MASTERGRAFT® GRANULES

## 2024-11-12 RX ORDER — FLUMAZENIL 0.1 MG/ML
0.2 INJECTION INTRAVENOUS AS NEEDED
Status: DISCONTINUED | OUTPATIENT
Start: 2024-11-12 | End: 2024-11-12 | Stop reason: HOSPADM

## 2024-11-12 RX ORDER — DEXMEDETOMIDINE HYDROCHLORIDE 4 UG/ML
INJECTION, SOLUTION INTRAVENOUS AS NEEDED
Status: DISCONTINUED | OUTPATIENT
Start: 2024-11-12 | End: 2024-11-12 | Stop reason: SURG

## 2024-11-12 RX ORDER — SODIUM CHLORIDE, SODIUM LACTATE, POTASSIUM CHLORIDE, CALCIUM CHLORIDE 600; 310; 30; 20 MG/100ML; MG/100ML; MG/100ML; MG/100ML
1000 INJECTION, SOLUTION INTRAVENOUS CONTINUOUS
Status: DISCONTINUED | OUTPATIENT
Start: 2024-11-12 | End: 2024-11-12

## 2024-11-12 RX ORDER — LISINOPRIL 20 MG/1
40 TABLET ORAL DAILY
Status: DISCONTINUED | OUTPATIENT
Start: 2024-11-12 | End: 2024-11-14 | Stop reason: HOSPADM

## 2024-11-12 RX ORDER — OXYCODONE AND ACETAMINOPHEN 7.5; 325 MG/1; MG/1
1 TABLET ORAL EVERY 6 HOURS PRN
Status: DISCONTINUED | OUTPATIENT
Start: 2024-11-12 | End: 2024-11-14 | Stop reason: HOSPADM

## 2024-11-12 RX ORDER — MIDAZOLAM HYDROCHLORIDE 2 MG/2ML
0.5 INJECTION, SOLUTION INTRAMUSCULAR; INTRAVENOUS
Status: DISCONTINUED | OUTPATIENT
Start: 2024-11-12 | End: 2024-11-12 | Stop reason: HOSPADM

## 2024-11-12 RX ORDER — CYCLOBENZAPRINE HCL 10 MG
10 TABLET ORAL 3 TIMES DAILY
Status: DISCONTINUED | OUTPATIENT
Start: 2024-11-12 | End: 2024-11-14 | Stop reason: HOSPADM

## 2024-11-12 RX ORDER — SODIUM CHLORIDE, SODIUM LACTATE, POTASSIUM CHLORIDE, CALCIUM CHLORIDE 600; 310; 30; 20 MG/100ML; MG/100ML; MG/100ML; MG/100ML
100 INJECTION, SOLUTION INTRAVENOUS CONTINUOUS
Status: DISCONTINUED | OUTPATIENT
Start: 2024-11-12 | End: 2024-11-12

## 2024-11-12 RX ORDER — CEPHALEXIN 500 MG/1
500 CAPSULE ORAL EVERY 8 HOURS SCHEDULED
Status: COMPLETED | OUTPATIENT
Start: 2024-11-12 | End: 2024-11-13

## 2024-11-12 RX ORDER — SODIUM CHLORIDE 0.9 % (FLUSH) 0.9 %
3 SYRINGE (ML) INJECTION EVERY 12 HOURS SCHEDULED
Status: DISCONTINUED | OUTPATIENT
Start: 2024-11-12 | End: 2024-11-12 | Stop reason: HOSPADM

## 2024-11-12 RX ORDER — LEVOTHYROXINE SODIUM 88 UG/1
88 TABLET ORAL
Status: DISCONTINUED | OUTPATIENT
Start: 2024-11-12 | End: 2024-11-14 | Stop reason: HOSPADM

## 2024-11-12 RX ORDER — MAGNESIUM HYDROXIDE 1200 MG/15ML
LIQUID ORAL AS NEEDED
Status: DISCONTINUED | OUTPATIENT
Start: 2024-11-12 | End: 2024-11-12 | Stop reason: HOSPADM

## 2024-11-12 RX ORDER — BUPIVACAINE HYDROCHLORIDE 2.5 MG/ML
INJECTION, SOLUTION INFILTRATION; PERINEURAL AS NEEDED
Status: DISCONTINUED | OUTPATIENT
Start: 2024-11-12 | End: 2024-11-12 | Stop reason: HOSPADM

## 2024-11-12 RX ORDER — FENTANYL CITRATE 50 UG/ML
50 INJECTION, SOLUTION INTRAMUSCULAR; INTRAVENOUS
Status: COMPLETED | OUTPATIENT
Start: 2024-11-12 | End: 2024-11-12

## 2024-11-12 RX ORDER — HYDROMORPHONE HYDROCHLORIDE 1 MG/ML
0.5 INJECTION, SOLUTION INTRAMUSCULAR; INTRAVENOUS; SUBCUTANEOUS
Status: DISCONTINUED | OUTPATIENT
Start: 2024-11-12 | End: 2024-11-12 | Stop reason: HOSPADM

## 2024-11-12 RX ORDER — EMPAGLIFLOZIN 25 MG/1
25 TABLET, FILM COATED ORAL DAILY
COMMUNITY
Start: 2024-10-22

## 2024-11-12 RX ORDER — LIDOCAINE HYDROCHLORIDE 20 MG/ML
INJECTION, SOLUTION EPIDURAL; INFILTRATION; INTRACAUDAL; PERINEURAL AS NEEDED
Status: DISCONTINUED | OUTPATIENT
Start: 2024-11-12 | End: 2024-11-12 | Stop reason: SURG

## 2024-11-12 RX ORDER — NALOXONE HCL 0.4 MG/ML
0.04 VIAL (ML) INJECTION AS NEEDED
Status: DISCONTINUED | OUTPATIENT
Start: 2024-11-12 | End: 2024-11-12 | Stop reason: HOSPADM

## 2024-11-12 RX ORDER — SODIUM CHLORIDE 9 MG/ML
40 INJECTION, SOLUTION INTRAVENOUS AS NEEDED
Status: DISCONTINUED | OUTPATIENT
Start: 2024-11-12 | End: 2024-11-12 | Stop reason: HOSPADM

## 2024-11-12 RX ORDER — EPHEDRINE SULFATE 50 MG/ML
INJECTION INTRAVENOUS AS NEEDED
Status: DISCONTINUED | OUTPATIENT
Start: 2024-11-12 | End: 2024-11-12 | Stop reason: SURG

## 2024-11-12 RX ORDER — FENTANYL CITRATE 50 UG/ML
INJECTION, SOLUTION INTRAMUSCULAR; INTRAVENOUS AS NEEDED
Status: DISCONTINUED | OUTPATIENT
Start: 2024-11-12 | End: 2024-11-12 | Stop reason: SURG

## 2024-11-12 RX ORDER — SODIUM CHLORIDE 0.9 % (FLUSH) 0.9 %
10 SYRINGE (ML) INJECTION AS NEEDED
Status: DISCONTINUED | OUTPATIENT
Start: 2024-11-12 | End: 2024-11-12 | Stop reason: HOSPADM

## 2024-11-12 RX ORDER — METOPROLOL TARTRATE 25 MG/1
25 TABLET, FILM COATED ORAL DAILY
Status: DISCONTINUED | OUTPATIENT
Start: 2024-11-13 | End: 2024-11-14 | Stop reason: HOSPADM

## 2024-11-12 RX ORDER — HYDROCHLOROTHIAZIDE 25 MG/1
25 TABLET ORAL DAILY
Status: DISCONTINUED | OUTPATIENT
Start: 2024-11-13 | End: 2024-11-14 | Stop reason: HOSPADM

## 2024-11-12 RX ORDER — SODIUM CHLORIDE 0.9 % (FLUSH) 0.9 %
3-10 SYRINGE (ML) INJECTION AS NEEDED
Status: DISCONTINUED | OUTPATIENT
Start: 2024-11-12 | End: 2024-11-12 | Stop reason: HOSPADM

## 2024-11-12 RX ORDER — ONDANSETRON 2 MG/ML
4 INJECTION INTRAMUSCULAR; INTRAVENOUS
Status: DISCONTINUED | OUTPATIENT
Start: 2024-11-12 | End: 2024-11-12 | Stop reason: HOSPADM

## 2024-11-12 RX ORDER — GABAPENTIN 300 MG/1
300 CAPSULE ORAL 3 TIMES DAILY
Status: DISCONTINUED | OUTPATIENT
Start: 2024-11-12 | End: 2024-11-14 | Stop reason: HOSPADM

## 2024-11-12 RX ORDER — DROPERIDOL 2.5 MG/ML
0.62 INJECTION, SOLUTION INTRAMUSCULAR; INTRAVENOUS ONCE AS NEEDED
Status: DISCONTINUED | OUTPATIENT
Start: 2024-11-12 | End: 2024-11-12 | Stop reason: HOSPADM

## 2024-11-12 RX ORDER — ATRACURIUM BESYLATE 10 MG/ML
INJECTION, SOLUTION INTRAVENOUS AS NEEDED
Status: DISCONTINUED | OUTPATIENT
Start: 2024-11-12 | End: 2024-11-12 | Stop reason: SURG

## 2024-11-12 RX ORDER — LABETALOL HYDROCHLORIDE 5 MG/ML
5 INJECTION, SOLUTION INTRAVENOUS
Status: DISCONTINUED | OUTPATIENT
Start: 2024-11-12 | End: 2024-11-12 | Stop reason: HOSPADM

## 2024-11-12 RX ORDER — OXYCODONE AND ACETAMINOPHEN 10; 325 MG/1; MG/1
1 TABLET ORAL EVERY 4 HOURS PRN
Status: DISCONTINUED | OUTPATIENT
Start: 2024-11-12 | End: 2024-11-12 | Stop reason: HOSPADM

## 2024-11-12 RX ORDER — DROPERIDOL 2.5 MG/ML
0.62 INJECTION, SOLUTION INTRAMUSCULAR; INTRAVENOUS ONCE AS NEEDED
Status: COMPLETED | OUTPATIENT
Start: 2024-11-12 | End: 2024-11-12

## 2024-11-12 RX ORDER — ATORVASTATIN CALCIUM 40 MG/1
40 TABLET, FILM COATED ORAL DAILY
Status: DISCONTINUED | OUTPATIENT
Start: 2024-11-13 | End: 2024-11-14 | Stop reason: HOSPADM

## 2024-11-12 RX ORDER — ONDANSETRON 2 MG/ML
INJECTION INTRAMUSCULAR; INTRAVENOUS AS NEEDED
Status: DISCONTINUED | OUTPATIENT
Start: 2024-11-12 | End: 2024-11-12 | Stop reason: SURG

## 2024-11-12 RX ORDER — ACETAMINOPHEN 500 MG
1000 TABLET ORAL ONCE
Status: COMPLETED | OUTPATIENT
Start: 2024-11-12 | End: 2024-11-12

## 2024-11-12 RX ORDER — KETOROLAC TROMETHAMINE 30 MG/ML
15 INJECTION, SOLUTION INTRAMUSCULAR; INTRAVENOUS EVERY 6 HOURS PRN
Status: DISCONTINUED | OUTPATIENT
Start: 2024-11-12 | End: 2024-11-14 | Stop reason: HOSPADM

## 2024-11-12 RX ORDER — LIDOCAINE HYDROCHLORIDE 10 MG/ML
0.5 INJECTION, SOLUTION EPIDURAL; INFILTRATION; INTRACAUDAL; PERINEURAL ONCE AS NEEDED
Status: DISCONTINUED | OUTPATIENT
Start: 2024-11-12 | End: 2024-11-12 | Stop reason: HOSPADM

## 2024-11-12 RX ORDER — PANTOPRAZOLE SODIUM 40 MG/1
40 TABLET, DELAYED RELEASE ORAL
Status: DISCONTINUED | OUTPATIENT
Start: 2024-11-13 | End: 2024-11-14 | Stop reason: HOSPADM

## 2024-11-12 RX ORDER — SODIUM CHLORIDE 0.9 % (FLUSH) 0.9 %
3 SYRINGE (ML) INJECTION AS NEEDED
Status: DISCONTINUED | OUTPATIENT
Start: 2024-11-12 | End: 2024-11-12 | Stop reason: HOSPADM

## 2024-11-12 RX ORDER — PROPOFOL 10 MG/ML
VIAL (ML) INTRAVENOUS AS NEEDED
Status: DISCONTINUED | OUTPATIENT
Start: 2024-11-12 | End: 2024-11-12 | Stop reason: SURG

## 2024-11-12 RX ORDER — AMLODIPINE BESYLATE 5 MG/1
5 TABLET ORAL DAILY
Status: DISCONTINUED | OUTPATIENT
Start: 2024-11-13 | End: 2024-11-14 | Stop reason: HOSPADM

## 2024-11-12 RX ORDER — IBUPROFEN 600 MG/1
600 TABLET, FILM COATED ORAL EVERY 6 HOURS PRN
Status: DISCONTINUED | OUTPATIENT
Start: 2024-11-12 | End: 2024-11-12 | Stop reason: HOSPADM

## 2024-11-12 RX ADMIN — FENTANYL CITRATE 500 MCG: 50 INJECTION, SOLUTION INTRAMUSCULAR; INTRAVENOUS at 08:10

## 2024-11-12 RX ADMIN — MIDAZOLAM HYDROCHLORIDE 0.5 MG: 1 INJECTION, SOLUTION INTRAMUSCULAR; INTRAVENOUS at 07:23

## 2024-11-12 RX ADMIN — GABAPENTIN 300 MG: 300 CAPSULE ORAL at 16:41

## 2024-11-12 RX ADMIN — EMPAGLIFLOZIN 25 MG: 25 TABLET, FILM COATED ORAL at 14:00

## 2024-11-12 RX ADMIN — ACETAMINOPHEN 1000 MG: 500 TABLET, FILM COATED ORAL at 07:22

## 2024-11-12 RX ADMIN — KETOROLAC TROMETHAMINE 30 MG: 30 INJECTION, SOLUTION INTRAMUSCULAR; INTRAVENOUS at 09:51

## 2024-11-12 RX ADMIN — INSULIN HUMAN 5 UNITS: 100 INJECTION, SOLUTION PARENTERAL at 10:51

## 2024-11-12 RX ADMIN — EPHEDRINE SULFATE 10 MG: 50 INJECTION INTRAVENOUS at 08:19

## 2024-11-12 RX ADMIN — CEFAZOLIN 2 G: 2 INJECTION, POWDER, FOR SOLUTION INTRAMUSCULAR; INTRAVENOUS at 08:09

## 2024-11-12 RX ADMIN — ATRACURIUM BESYLATE 10 MG: 10 INJECTION, SOLUTION INTRAVENOUS at 08:10

## 2024-11-12 RX ADMIN — OXYCODONE HYDROCHLORIDE AND ACETAMINOPHEN 1 TABLET: 10; 325 TABLET ORAL at 11:20

## 2024-11-12 RX ADMIN — GABAPENTIN 300 MG: 300 CAPSULE ORAL at 20:54

## 2024-11-12 RX ADMIN — ONDANSETRON 4 MG: 2 INJECTION INTRAMUSCULAR; INTRAVENOUS at 08:09

## 2024-11-12 RX ADMIN — SODIUM CHLORIDE, POTASSIUM CHLORIDE, SODIUM LACTATE AND CALCIUM CHLORIDE 1000 ML: 600; 310; 30; 20 INJECTION, SOLUTION INTRAVENOUS at 07:25

## 2024-11-12 RX ADMIN — PROPOFOL 200 MG: 10 INJECTION, EMULSION INTRAVENOUS at 08:10

## 2024-11-12 RX ADMIN — LIDOCAINE HYDROCHLORIDE 200 MG: 20 INJECTION, SOLUTION EPIDURAL; INFILTRATION; INTRACAUDAL; PERINEURAL at 08:10

## 2024-11-12 RX ADMIN — CYCLOBENZAPRINE HYDROCHLORIDE 10 MG: 10 TABLET, FILM COATED ORAL at 20:54

## 2024-11-12 RX ADMIN — HYDROMORPHONE HYDROCHLORIDE 0.5 MG: 1 INJECTION, SOLUTION INTRAMUSCULAR; INTRAVENOUS; SUBCUTANEOUS at 11:04

## 2024-11-12 RX ADMIN — CEPHALEXIN 500 MG: 500 CAPSULE ORAL at 20:54

## 2024-11-12 RX ADMIN — CEPHALEXIN 500 MG: 500 CAPSULE ORAL at 14:00

## 2024-11-12 RX ADMIN — FENTANYL CITRATE 50 MCG: 50 INJECTION, SOLUTION INTRAMUSCULAR; INTRAVENOUS at 10:39

## 2024-11-12 RX ADMIN — HYDROMORPHONE HYDROCHLORIDE 0.5 MG: 1 INJECTION, SOLUTION INTRAMUSCULAR; INTRAVENOUS; SUBCUTANEOUS at 10:45

## 2024-11-12 RX ADMIN — CYCLOBENZAPRINE HYDROCHLORIDE 10 MG: 10 TABLET, FILM COATED ORAL at 16:45

## 2024-11-12 RX ADMIN — DROPERIDOL 1.25 MG: 2.5 INJECTION, SOLUTION INTRAMUSCULAR; INTRAVENOUS at 08:09

## 2024-11-12 RX ADMIN — HYDROMORPHONE HYDROCHLORIDE 0.5 MG: 1 INJECTION, SOLUTION INTRAMUSCULAR; INTRAVENOUS; SUBCUTANEOUS at 10:32

## 2024-11-12 RX ADMIN — DEXMEDETOMIDINE HYDROCHLORIDE 20 MCG: 4 INJECTION, SOLUTION INTRAVENOUS at 08:09

## 2024-11-12 RX ADMIN — LEVOTHYROXINE SODIUM 88 MCG: 88 TABLET ORAL at 14:00

## 2024-11-12 RX ADMIN — FENTANYL CITRATE 50 MCG: 50 INJECTION, SOLUTION INTRAMUSCULAR; INTRAVENOUS at 10:55

## 2024-11-12 RX ADMIN — OXYCODONE HYDROCHLORIDE AND ACETAMINOPHEN 1 TABLET: 7.5; 325 TABLET ORAL at 18:36

## 2024-11-12 RX ADMIN — LISINOPRIL 40 MG: 20 TABLET ORAL at 14:00

## 2024-11-12 NOTE — ANESTHESIA PROCEDURE NOTES
Airway  Urgency: elective    Date/Time: 11/12/2024 8:10 AM  Airway not difficult    General Information and Staff    Patient location during procedure: OR  CRNA/CAA: Uriah Bains CRNA    Indications and Patient Condition  Indications for airway management: airway protection    Preoxygenated: yes  MILS maintained throughout  Mask difficulty assessment: 1 - vent by mask    Final Airway Details  Final airway type: endotracheal airway      Successful airway: ETT  Cuffed: yes   Successful intubation technique: direct laryngoscopy  Endotracheal tube insertion site: oral  Blade: Rodriguez  Blade size: 2  ETT size (mm): 7.5  Cormack-Lehane Classification: grade I - full view of glottis  Placement verified by: chest auscultation and capnometry   Cuff volume (mL): 5  Measured from: lips  ETT/EBT  to lips (cm): 20  Number of attempts at approach: 1  Assessment: lips, teeth, and gum same as pre-op and atraumatic intubation

## 2024-11-12 NOTE — H&P
H&P    CC: Back and right leg pain          HPI: Patient is a 74-year-old female with a spondylolisthesis at L4/5 and right radicular pain.  She is here today for a L4/5 transforaminal lumbar interbody fusion.    Review of Systems     Pertinent positives/negatives documented in HPI.  All other systems reviewed and negative.    Past Medical History:  has a past medical history of Arthritis, DM (diabetes mellitus), HTN (hypertension), Low back pain, Neuropathy, Thyroid disease, and Urinary urgency.    Past Surgical History:  has a past surgical history that includes Cholecystectomy; Hysterectomy; Thyroidectomy; Back surgery; Cataract extraction w/ intraocular lens implant (Bilateral); and Rotator cuff repair (Right).    Family History: family history is not on file.    Social History:  reports that she has never smoked. She has never used smokeless tobacco. She reports that she does not drink alcohol and does not use drugs.    Allergies: Patient has no known allergies.    Medications: Scheduled Meds:acetaminophen, 1,000 mg, Oral, Once  ceFAZolin, 2,000 mg, Intravenous, Once  sodium chloride, 3 mL, Intravenous, Q12H      Continuous Infusions:lactated ringers, 1,000 mL  lactated ringers, 1,000 mL  lactated ringers, 100 mL/hr      PRN Meds:.  droperidol **OR** droperidol    lidocaine PF 1%    midazolam    sodium chloride    sodium chloride    sodium chloride    sodium chloride     Objective:  Vital signs: (most recent): Blood pressure 131/73, pulse 67, temperature 98.5 °F (36.9 °C), temperature source Temporal, resp. rate 16, weight 71.6 kg (157 lb 13.6 oz), SpO2 94%.        Neurological Exam  Mental Status  Awake, alert and oriented to person, place and time. Oriented to person, place and time. Speech is normal. Language is fluent with no aphasia. Attention and concentration are normal. Fund of knowledge is appropriate for level of education.    Gait  Casual gait is normal including stance, stride, and arm swing.        Vital Signs  Temp:  [98.5 °F (36.9 °C)] 98.5 °F (36.9 °C)  Heart Rate:  [66-67] 67  Resp:  [16] 16  BP: (131)/(73) 131/73    Physical Exam  Psychiatric:         Speech: Speech normal.         Results Review:   I reviewed the patient's new clinical results.  I reviewed the patient's new imaging results and agree with the interpretation.  I reviewed the patient's other test results and agree with the interpretation          Lab Results (last 24 hours)       Procedure Component Value Units Date/Time    POC Glucose Once [[id number]]  (Abnormal) Collected: 11/12/24 0546    Specimen: Blood Updated: 11/12/24 0557     Glucose 218 mg/dL      Comment: : [id number] Shea GarciaMeter ID: [id number]                 Assessment/Plan:   L4/5 spondylolisthesis with stenosis    Today for transforaminal lumbar interbody fusion L4/5      Spondylolisthesis of lumbar region      I discussed the patient's findings and my recommendations with patient    Shine Enriquez DO  11/12/24  07:15 CST    I spent 30 minutes caring for Roxann on this date of service. This time includes time spent by me in the following activities: preparing for the visit, reviewing tests, and performing a medically appropriate examination and/or evaluation

## 2024-11-12 NOTE — ANESTHESIA PREPROCEDURE EVALUATION
Anesthesia Evaluation     Patient summary reviewed   no history of anesthetic complications:   NPO Solid Status: > 8 hours             Airway   Mallampati: II  Dental      Pulmonary    (-) sleep apnea, not a smoker  Cardiovascular     Patient on routine beta blocker    (+) hypertension  (-) cardiac stents, CABG      Neuro/Psych  (+) numbness  (-) seizures, CVA  GI/Hepatic/Renal/Endo    (+) diabetes mellitus, thyroid problem     Musculoskeletal     Abdominal    Substance History      OB/GYN          Other                          Anesthesia Plan    ASA 2     general     intravenous induction     Anesthetic plan, risks, benefits, and alternatives have been provided, discussed and informed consent has been obtained with: patient.        CODE STATUS:

## 2024-11-12 NOTE — PLAN OF CARE
Goal Outcome Evaluation:  Plan of Care Reviewed With: patient        Progress: no change  Outcome Evaluation: Pt A&Ox4. Due to void. Dsg to back CDI. PPP. denies n/t. No c/o of pain. Call light in reach. Safety maintained.

## 2024-11-13 LAB
GLUCOSE BLDC GLUCOMTR-MCNC: 142 MG/DL (ref 70–130)
GLUCOSE BLDC GLUCOMTR-MCNC: 205 MG/DL (ref 70–130)
GLUCOSE BLDC GLUCOMTR-MCNC: 230 MG/DL (ref 70–130)

## 2024-11-13 PROCEDURE — 25010000002 KETOROLAC TROMETHAMINE PER 15 MG: Performed by: NEUROLOGICAL SURGERY

## 2024-11-13 PROCEDURE — 97166 OT EVAL MOD COMPLEX 45 MIN: CPT

## 2024-11-13 PROCEDURE — 97161 PT EVAL LOW COMPLEX 20 MIN: CPT | Performed by: PHYSICAL THERAPIST

## 2024-11-13 PROCEDURE — G0378 HOSPITAL OBSERVATION PER HR: HCPCS

## 2024-11-13 PROCEDURE — 99024 POSTOP FOLLOW-UP VISIT: CPT | Performed by: PHYSICIAN ASSISTANT

## 2024-11-13 PROCEDURE — 82948 REAGENT STRIP/BLOOD GLUCOSE: CPT

## 2024-11-13 RX ADMIN — LISINOPRIL 40 MG: 20 TABLET ORAL at 08:00

## 2024-11-13 RX ADMIN — GABAPENTIN 300 MG: 300 CAPSULE ORAL at 22:20

## 2024-11-13 RX ADMIN — GABAPENTIN 300 MG: 300 CAPSULE ORAL at 15:29

## 2024-11-13 RX ADMIN — CEPHALEXIN 500 MG: 500 CAPSULE ORAL at 05:38

## 2024-11-13 RX ADMIN — OXYCODONE HYDROCHLORIDE AND ACETAMINOPHEN 1 TABLET: 7.5; 325 TABLET ORAL at 05:41

## 2024-11-13 RX ADMIN — KETOROLAC TROMETHAMINE 15 MG: 30 INJECTION, SOLUTION INTRAMUSCULAR; INTRAVENOUS at 08:00

## 2024-11-13 RX ADMIN — CYCLOBENZAPRINE HYDROCHLORIDE 10 MG: 10 TABLET, FILM COATED ORAL at 08:00

## 2024-11-13 RX ADMIN — OXYCODONE HYDROCHLORIDE AND ACETAMINOPHEN 1 TABLET: 7.5; 325 TABLET ORAL at 16:38

## 2024-11-13 RX ADMIN — METOPROLOL TARTRATE 25 MG: 25 TABLET, FILM COATED ORAL at 08:00

## 2024-11-13 RX ADMIN — CEPHALEXIN 500 MG: 500 CAPSULE ORAL at 13:54

## 2024-11-13 RX ADMIN — EMPAGLIFLOZIN 25 MG: 25 TABLET, FILM COATED ORAL at 08:00

## 2024-11-13 RX ADMIN — PANTOPRAZOLE SODIUM 40 MG: 40 TABLET, DELAYED RELEASE ORAL at 05:38

## 2024-11-13 RX ADMIN — HYDROCHLOROTHIAZIDE 25 MG: 25 TABLET ORAL at 08:00

## 2024-11-13 RX ADMIN — AMLODIPINE BESYLATE 5 MG: 5 TABLET ORAL at 08:00

## 2024-11-13 RX ADMIN — GABAPENTIN 300 MG: 300 CAPSULE ORAL at 08:00

## 2024-11-13 RX ADMIN — ATORVASTATIN CALCIUM 40 MG: 40 TABLET, FILM COATED ORAL at 08:00

## 2024-11-13 RX ADMIN — CYCLOBENZAPRINE HYDROCHLORIDE 10 MG: 10 TABLET, FILM COATED ORAL at 22:20

## 2024-11-13 RX ADMIN — CYCLOBENZAPRINE HYDROCHLORIDE 10 MG: 10 TABLET, FILM COATED ORAL at 15:29

## 2024-11-13 RX ADMIN — LEVOTHYROXINE SODIUM 88 MCG: 88 TABLET ORAL at 05:38

## 2024-11-13 NOTE — PROGRESS NOTES
Roxann Mendenhall  74 y.o.      Chief complaint:   Right Buttock Pain    Subjective:  Symptoms:  Stable.  No chest pain, headache, chest pressure or anxiety.    Diet:  Adequate intake.  No nausea or vomiting.    Activity level: Impaired due to pain.    Pain:  She complains of pain that is moderate.  Pain is requiring pain medication.      Roxann is postop day #1 status post L4-5 TLIF.  She is currently sitting up in the chair.  She complains of burning pain in the right buttock and lateral hip region.  She does not have any pain radiating down the leg and denies paresthesias.    Temp:  [96.7 °F (35.9 °C)-98.5 °F (36.9 °C)] 98.5 °F (36.9 °C)  Heart Rate:  [] 95  Resp:  [12-16] 15  BP: ()/(48-93) 100/62      Objective:  General Appearance:  In pain.    Vital signs: (most recent): Blood pressure 100/62, pulse 95, temperature 98.5 °F (36.9 °C), temperature source Oral, resp. rate 15, weight 71.6 kg (157 lb 13.6 oz), SpO2 96%.  Vital signs are normal.  No fever.    Output: Producing urine.    Lungs:  Normal effort.    Heart: Normal rate.    Extremities: Normal range of motion.    Skin:  Warm and dry.    Pulses: Distal pulses are intact.        Neurological Exam  Mental Status  Awake, alert and oriented to person, place and time.    Motor  Normal muscle bulk throughout. Normal muscle tone. Strength is 5/5 throughout all four extremities.    Sensory  Sensation is intact to light touch, pinprick, vibration and proprioception in all four extremities.    Gait    Gait not tested.       Lab Results (last 24 hours)       Procedure Component Value Units Date/Time    POC Glucose Once [[id number]]  (Abnormal) Collected: 11/13/24 0740    Specimen: Blood Updated: 11/13/24 0751     Glucose 142 mg/dL      Comment: : [id number] Burgess BricetonMeter ID: [id number]       POC Glucose Once [[id number]]  (Abnormal) Collected: 11/12/24 2104    Specimen: Blood Updated: 11/12/24 2115     Glucose 178 mg/dL      Comment: :  bereket Harris ID: [id number]       POC Glucose Once [[id number]]  (Abnormal) Collected: 11/12/24 1659    Specimen: Blood Updated: 11/12/24 1710     Glucose 266 mg/dL      Comment: : [id number] Saniya Camacho ID: [id number]       POC Glucose Once [[id number]]  (Abnormal) Collected: 11/12/24 1130    Specimen: Blood Updated: 11/12/24 1142     Glucose 215 mg/dL      Comment: : [id number] Rafa EspinosalykarlieMeter ID: [id number]       POC Glucose Once [[id number]]  (Abnormal) Collected: 11/12/24 1016    Specimen: Blood Updated: 11/12/24 1030     Glucose 263 mg/dL      Comment: : [id number] Rafa EspinosalynnMeter ID: [id number]                   Plan:   Roxann is having some nerve pain in her right buttock and right lateral hip region that she was not having before surgery.  I reassured her this is probably secondary to nerve irritation.  She has not yet tried any of the IV Toradol so we will get that started.  She will continue gabapentin as current.  She is neurologically stable.    PT/OT for ambulation  LSO brace when out of bed  No lifting greater than 10 pounds  No bending or twisting  Continue current medications  Possible home today depending on her pain level and how she does with physical therapy      Spondylolisthesis of lumbar region    Spondylolisthesis        Jayson Ramirez PA-C

## 2024-11-13 NOTE — PLAN OF CARE
"Goal Outcome Evaluation:  Plan of Care Reviewed With: patient           Outcome Evaluation: OT evluation completed. A&O x4, but presents with significant lethargy. Per nsg, pt given toradol at 8am due to spasms/pain through R buttocks/hip and back. Upon arrival, pt sitting in chair, reports pain 10/10 through same areas and requesting to get back in bed, LSO donned and supplemental O2 1L. Educated pt on LSO wearing schedule and spinal precautions. Pt completes sit<>stand several times with min A, sitting down a few times before being able to complete transfer due to her \"legs not holding her.\" Pt does completed transfer with min A and VCs. Pt BUE assessed EOB, ROM WFL, strength grossly 4-/5 through Michael shlds and other BUE WFL. Sensation intact, coordination intact. Requires max A to don/doff socks. Pt educated on log rolling technique, completes sit<>supine with min A. Pt situated on L side with pillows per pt request and adhering to spinal precautions. Pt left sidelying left in bed, call light within reach, O2 1L donned, nsg notified. OT to continue to see patient for transfer training, adl training, and overall balance/endurance/strength training. D/c home with 24/7 care and home health.    Anticipated Discharge Disposition (OT): home with 24/7 care, home with home health, other (see comments) (pt reports planning to go to son's home for assistance, which therapist encouraged)                        "

## 2024-11-13 NOTE — BRIEF OP NOTE
LUMBAR LAMINECTOMY TRANSFORAMINAL LUMBAR INTERBODY FUSION L4/5  Roxann J Abdirashid  11/12/2024    Pre-op Diagnosis:   Spondylolisthesis of lumbar region [M43.16]       Post-Op Diagnosis Codes:     * Spondylolisthesis of lumbar region [M43.16]    Procedure/CPT® Codes:        Procedure(s):  LUMBAR LAMINECTOMY TRANSFORAMINAL LUMBAR INTERBODY FUSION L4-5              Surgeon(s):  Shnie Enriquez DO    Anesthesia: General    Staff:   Circulator: Floyd Lafleur RN  Scrub Person: Yuli Jones; Briana Carter  Vendor Representative: Ángela Ch Shelby         Estimated Blood Loss: minimal    Urine Voided: * No values recorded between 11/12/2024  8:09 AM and 11/12/2024 10:07 AM *    Specimens:                None          Drains: * No LDAs found *    Findings: L4/5 spondylolisthesis with severe osteoporosis particularly at L5        Complications: None          Shine Enriquez DO     Date: 11/13/2024  Time: 10:05 CST

## 2024-11-13 NOTE — PLAN OF CARE
Goal Outcome Evaluation:           Progress: no change   Pt A/Ox4. VSS on RA or 2L NC. When awake, pt O2 ranges from %, but when asleep drops into 80's. IV to RFA,IID. Continent. Dressing to spine intact with dried drainage. Pt stated they were not comfortable being discharged today, may go home tomorrow. Safety maintained. Call light in reach.

## 2024-11-13 NOTE — BRIEF OP NOTE
LUMBAR LAMINECTOMY TRANSFORAMINAL LUMBAR INTERBODY FUSION L4/5      Roxann J Abdirashid  11/12/2024    Pre-op Diagnosis:   Spondylolisthesis of lumbar region [M43.16]       Post-Op Diagnosis Codes:     * Spondylolisthesis of lumbar region [M43.16]    Procedure/CPT® Codes:        Procedure(s):  LUMBAR LAMINECTOMY TRANSFORAMINAL LUMBAR INTERBODY FUSION L4-5              Surgeon(s):  Shine Enriquez DO    Anesthesia: General    Staff:   Circulator: Floyd Lafleur RN  Scrub Person: Yuli Jones; Briana Carter  Vendor Representative: Ángela Ch Shelby         Estimated Blood Loss: minimal    Urine Voided: * No values recorded between 11/12/2024  8:09 AM and 11/12/2024 10:07 AM *    Specimens:                None          Drains: * No LDAs found *    Findings: L4/5 spondylolisthesis with severe to porosis particularly at L5        Complications: None          Shine Enriquez DO     Date: 11/13/2024  Time: 10:06 CST

## 2024-11-13 NOTE — OP NOTE
LUMBAR LAMINECTOMY TRANSFORAMINAL LUMBAR INTERBODY FUSION L4/5      Roxann Mendenhall  11/12/2024    Pre-op Diagnosis:   Spondylolisthesis of lumbar region [M43.16]       Post-Op Diagnosis Codes:     * Spondylolisthesis of lumbar region [M43.16]    Procedure/CPT® Codes:        Procedure(s):  LUMBAR LAMINECTOMY TRANSFORAMINAL LUMBAR INTERBODY FUSION L4-5              Surgeon(s):  Shine Enriquez,     Anesthesia: General    Staff:   Circulator: Floyd Lafleur RN  Scrub Person: Yuli Jones; Briana Carter  Vendor Representative: Ángela Ch Shelby         Estimated Blood Loss: minimal    Urine Voided: * No values recorded between 11/12/2024  8:09 AM and 11/12/2024 10:07 AM *    Specimens:                None    Spinal Surgery Levels Completed:1 Level      Drains: * No LDAs found *    Findings: L4/5 spondylolisthesis with severe to Osteporosis particularly at L5        Complications: None      Procedure    Patient is a 74-year-old female who is seen in my office complaining of severe right leg pain and unfortunately has a spondylolisthesis at L4/5.  She has had a previous fusion at L5/S1 and because she failed conservative management I did offer her a transforaminal lumbar interbody fusion at L4/5 to hopefully stabilize this level and give her some relief.  I did explain to her the risks and benefits of the surgery and she wished to proceed.  She was brought to the operative suite and put under general anesthetic and then turned onto the Garo table and padded at all the appropriate points.  We then prepped and draped in a sterile fashion using a 5-minute DuraPrep scrub sterile towels Ioban and sterile drapes.  We then brought in AP and lateral fluoroscopy units and marked her incision sites and then infiltrated with 1% lidocaine with epinephrine and opened with a 10 blade and dissected with the Bovie cautery down to the lumbodorsal fascia.  We then placed K wires using multiple AP and lateral  fluoroscopy images into the L4 and L5 pedicles and then snapped the K wires out of the way and open the fascia from K wire to K wire at L4/5 on the right and then dilated with a quadrant dilator system over the L4/5 facet.  We then brought in the intraoperative microscope for microdissection portion the case and then used a high-speed drill to drill out the facet at L4/5 and decompress the L4 and L5 nerve roots and expose the annulus.  We then did an annulotomy and an aggressive discectomy using the paddle victor hugo pituitary rongeurs and the serrated curettes to prepare the endplates.  We then sized her for a titanium implant which was packed with graft on and master graft and the disc space itself was also packed with graft on and master graft and we placed the interbody device and got x-rays to show its location.  Once this portion of the surgery was complete we irrigated with copious amounts antibiotic saline got meticulous hemostasis with bipolar cautery.  We then moved down to the pedicle screw portion of the case and dilated over each of the K wires and then placed pedicle screws into L4 and L5 bilaterally.  Once this was completed we then used the sextant swing arm to swing a cesia in between the heads of the screws and we locked down the L5 screws and then used the sextant reduction system to reduce the spondylolisthesis and locked down the L4 screws.  Once everything was torqued to the appropriate torque we removed the sextant system and got final AP and lateral fluoroscopy images which showed good placement of all instrumentation.  We then closed the fascia with 0 Nurolon and injected intramuscular Marcaine for postoperative pain control and then closed the skin with 2-0 Vicryl Mastisol Steri-Strips Telfa and OpSite.  Patient went to postoperative recovery in stable condition.  LSO brace has been ordered to reduce pain by restricting mobility and to facilitate healing following a surgical procedure of the  spine.

## 2024-11-13 NOTE — THERAPY EVALUATION
Patient Name: Roxann Mendenhall  : 1950    MRN: [id number]                              Today's Date: 2024       Admit Date: 2024    Visit Dx:     ICD-10-CM ICD-9-CM   1. Spondylolisthesis of lumbar region  M43.16 738.4     Patient Active Problem List   Diagnosis    Sacroiliitis    Spondylolisthesis of lumbar region    Lumbar radiculopathy    Spondylolisthesis     Past Medical History:   Diagnosis Date    Arthritis     DM (diabetes mellitus)     HTN (hypertension)     Low back pain     Neuropathy     Thyroid disease     Urinary urgency      Past Surgical History:   Procedure Laterality Date    BACK SURGERY      CATARACT EXTRACTION W/ INTRAOCULAR LENS IMPLANT Bilateral     CHOLECYSTECTOMY      HYSTERECTOMY      LUMBAR LAMINECTOMY WITH FUSION Bilateral 2024    Procedure: LUMBAR LAMINECTOMY TRANSFORAMINAL LUMBAR INTERBODY FUSION L4-5;  Surgeon: Shine Enriquez DO;  Location: [facility];  Service: Neurosurgery;  Laterality: Bilateral;    ROTATOR CUFF REPAIR Right     AND BICEP TENDON REPAIR    THYROIDECTOMY        General Information       Row Name 24 1405          Physical Therapy Time and Intention    Document Type evaluation  s/p TLIF L4-5 due to spondylolisthesis at L4-5 and R LE radiculopathy  -MS     Mode of Treatment physical therapy;individual therapy  -MS       Row Name 24 140          General Information    Patient Profile Reviewed yes  -MS     Prior Level of Function independent:;all household mobility;community mobility;ADL's;driving  -MS     Existing Precautions/Restrictions LSO;brace worn when out of bed;fall;oxygen therapy device and L/min  -MS     Barriers to Rehab physical barrier  -MS       Row Name 24 1405          Living Environment    People in Home alone  pt is going to her son's house for a few days  -MS       Row Name 24 1405          Home Main Entrance    Number of Stairs, Main Entrance seven  to get into her son's house  -MS     Stair Railings,  Main Entrance railings on both sides of stairs  -MS       Row Name 11/13/24 1405          Cognition    Orientation Status (Cognition) oriented x 4  -MS       Row Name 11/13/24 1405          Safety Issues/Impairments Affecting Functional Mobility    Impairments Affecting Function (Mobility) balance;endurance/activity tolerance;pain  -MS               User Key  (r) = Recorded By, (t) = Taken By, (c) = Cosigned By      Initials Name Provider Type    Yuli Sheppard BEBETO, PT, DPT, NCS Physical Therapist                   Mobility       Row Name 11/13/24 1405          Bed Mobility    Bed Mobility rolling left;sidelying-sit  -MS     Rolling Left [place] (Bed Mobility) modified independence  -MS     Sidelying-Sit [place] (Bed Mobility) modified independence  -MS     Assistive Device (Bed Mobility) bed rails;head of bed elevated  -MS       Row Name 11/13/24 1405          Sit-Stand Transfer    Sit-Stand [place] (Transfers) minimum assist (75% patient effort);verbal cues;nonverbal cues (demo/gesture)  -MS     Assistive Device (Sit-Stand Transfers) --  HHA  -MS       Row Name 11/13/24 1405          Gait/Stairs (Locomotion)    [place] Level (Gait) contact guard;minimum assist (75% patient effort);verbal cues;nonverbal cues (demo/gesture)  -MS     Distance in Feet (Gait) 100  -MS     Comment, (Gait/Stairs) wide base of support and crouched gait at first. Pt improved over time  -MS               User Key  (r) = Recorded By, (t) = Taken By, (c) = Cosigned By      Initials Name Provider Type    Yuli Sheppard BEBETO, PT, DPT, NCS Physical Therapist                   Obj/Interventions       Row Name 11/13/24 1405          Range of Motion Comprehensive    General Range of Motion bilateral upper extremity ROM WFL;bilateral lower extremity ROM WFL  -MS       Row Name 11/13/24 1405          Strength Comprehensive (MMT)    Comment, General Manual Muscle Testing (MMT) Assessment B hip flexion painful with testing 3+/5,  otherwise pt demonstrates myoclonuic muscle contractions during testing, most likely due to drowsiness. Difficult to perform testing. Functionally 4/5  -MS       Row Name 11/13/24 1405          Balance    Balance Assessment sitting static balance;sitting dynamic balance;standing static balance;standing dynamic balance  -MS     Static Sitting Balance standby assist  -MS     Dynamic Sitting Balance standby assist  -MS     Position, Sitting Balance supported;sitting edge of bed  -MS     Static Standing Balance minimal assist  -MS     Dynamic Standing Balance minimal assist  -MS     Position/Device Used, Standing Balance supported  HHA  -MS       Row Name 11/13/24 1405          Sensory Assessment (Somatosensory)    Sensory Assessment (Somatosensory) sensation intact  -MS               User Key  (r) = Recorded By, (t) = Taken By, (c) = Cosigned By      Initials Name Provider Type    Yuli Sheppard, PT, DPT, NCS Physical Therapist                   Goals/Plan       Row Name 11/13/24 1405          Bed Mobility Goal 1 (PT)    Activity/Assistive Device (Bed Mobility Goal 1, PT) bed mobility activities, all  -MS     [place] Level/Cues Needed (Bed Mobility Goal 1, PT) independent  -MS     Time Frame (Bed Mobility Goal 1, PT) long term goal (LTG)  -MS     Progress/Outcomes (Bed Mobility Goal 1, PT) new goal  -MS       Row Name 11/13/24 1405          Transfer Goal 1 (PT)    Activity/Assistive Device (Transfer Goal 1, PT) sit-to-stand/stand-to-sit;bed-to-chair/chair-to-bed  -MS     [place] Level/Cues Needed (Transfer Goal 1, PT) independent  -MS     Time Frame (Transfer Goal 1, PT) long term goal (LTG);by discharge  -MS     Progress/Outcome (Transfer Goal 1, PT) new goal  -MS       Row Name 11/13/24 1405          Gait Training Goal 1 (PT)    Activity/Assistive Device (Gait Training Goal 1, PT) gait (walking locomotion);decrease fall risk;increase endurance/gait distance;improve balance and speed  -MS      [place] Level (Gait Training Goal 1, PT) independent  -MS     Distance (Gait Training Goal 1, PT) 200ft  -MS     Time Frame (Gait Training Goal 1, PT) long term goal (LTG);by discharge  -MS     Progress/Outcome (Gait Training Goal 1, PT) new goal  -MS       Row Name 11/13/24 1405          Stairs Goal 1 (PT)    Activity/Assistive Device (Stairs Goal 1, PT) ascending stairs;descending stairs;using handrail, left;using handrail, right;step-to-step  -MS     [place] Level/Cues Needed (Stairs Goal 1, PT) modified independence  -MS     Number of Stairs (Stairs Goal 1, PT) 7  -MS     Time Frame (Stairs Goal 1, PT) long term goal (LTG);by discharge  -MS     Progress/Outcome (Stairs Goal 1, PT) new goal  -MS       Row Name 11/13/24 1406          Problem Specific Goal 1 (PT)    Problem Specific Goal 1 (PT) The patient will independently implement 1 pain management technique to decrease pain in order to improve functional mobility.  -MS     Time Frame (Problem Specific Goal 1, PT) long-term goal (LTG);30 days  -MS     Progress/Outcome (Problem Specific Goal 1, PT) new goal  -MS       Row Name 11/13/24 1407          Therapy Assessment/Plan (PT)    Planned Therapy Interventions (PT) balance training;bed mobility training;gait training;patient/family education;orthotic fitting/training;stair training;transfer training  -MS               User Key  (r) = Recorded By, (t) = Taken By, (c) = Cosigned By      Initials Name Provider Type    MS Yuli Swain, PT, DPT, NCS Physical Therapist                   Clinical Impression       Row Name 11/13/24 1404          Pain    Pretreatment Pain Rating 8/10  -MS     Pain Location back  -MS     Pain Management Interventions exercise or physical activity utilized  -MS     Response to Pain Interventions activity participation with tolerable pain  -MS       Row Name 11/13/24 1405          Plan of Care Review    Plan of Care Reviewed With patient  -MS     Progress no change  -MS      Outcome Evaluation The patient presents drowsy but oriented x4 lying in bed. She reports thats she has been sleeping most of the day. She is currently on 3L of supplemental O2 when asleep. She does not typically wear O2 at home. She remembers 2/3 spinal restrictions. She demonstrates myoclonic like jerking during MMT which made testing difficult. This jerking was most likely due to her drowsiness. She woke up more as the evaluation progressed, but she was still not fully alert at the end of the evaluation. She was able to ambulate in the hallway with min A. She also progressed to RA as she increased her activity and became more alert. She demonstrates no focal neurological deficits in strength, sensation, or coordination. She will benefit from continued PT to work on increasing her activity and education for spinal restrictions. She plans to stay with her son for a few days upon discharge.  -MS       Row Name 11/13/24 6864          Therapy Assessment/Plan (PT)    Patient/Family Therapy Goals Statement (PT) decrease pain and wake up  -MS     Rehab Potential (PT) good  -MS     Criteria for Skilled Interventions Met (PT) yes;meets criteria;skilled treatment is necessary  -MS     Therapy Frequency (PT) 2 times/day  -MS     Predicted Duration of Therapy Intervention (PT) until discharge  -MS       Row Name 11/13/24 1401          Vital Signs    Pre SpO2 (%) 90  -MS     O2 Delivery Pre Treatment supplemental O2  -MS     Intra SpO2 (%) 93  -MS     Post SpO2 (%) 100  -MS     O2 Delivery Post Treatment room air  -MS     Pre Patient Position Supine  -MS     Intra Patient Position Sitting  -MS     Post Patient Position Sitting  -MS       Row Name 11/13/24 1902          Positioning and Restraints    Post Treatment Position chair  -MS     In Chair notified nsg;sitting;call light within reach;encouraged to call for assist;with brace;exit alarm on  -MS               User Key  (r) = Recorded By, (t) = Taken By, (c) = Cosigned By       Initials Name Provider Type    Yuli Sheppard, PT, DPT, NCS Physical Therapist                   Outcome Measures       Row Name 11/13/24 1405 11/13/24 0800       How much help from another person do you currently need...    Turning from your back to your side while in flat bed without using bedrails? 3  -MS 3  -SS    Moving from lying on back to sitting on the side of a flat bed without bedrails? 3  -MS 3  -SS    Moving to and from a bed to a chair (including a wheelchair)? 3  -MS 3  -SS    Standing up from a chair using your arms (e.g., wheelchair, bedside chair)? 3  -MS 3  -SS    Climbing 3-5 steps with a railing? 2  -MS 2  -SS    To walk in hospital room? 3  -MS 3  -SS    AM-PAC 6 Clicks Score (PT) 17  -MS 17  -SS    Highest Level of Mobility Goal 5 --> Static standing  -MS 5 --> Static standing  -SS      Row Name 11/13/24 1405 11/13/24 1037       Functional Assessment    Outcome Measure Options AM-PAC 6 Clicks Basic Mobility (PT)  -MS AM-PAC 6 Clicks Daily Activity (OT)  -              User Key  (r) = Recorded By, (t) = Taken By, (c) = Cosigned By      Initials Name Provider Type    Yuli Sheppard, PT, DPT, NCS Physical Therapist    SS Bruna Edmond, RN Registered Nurse    Kemi Rosales, OTR/L Occupational Therapist                                 Physical Therapy Education       Title: PT OT SLP Therapies (In Progress)       Topic: Physical Therapy (In Progress)       Point: Mobility training (In Progress)       Learning Progress Summary            Patient Acceptance, E, NR by MS at 11/13/2024 2038    Comment: role of PT in her care, spinal restrictions, use of LSO                      Point: Home exercise program (Not Started)       Learner Progress:  Not documented in this visit.              Point: Body mechanics (Not Started)       Learner Progress:  Not documented in this visit.              Point: Precautions (In Progress)       Learning Progress Summary            Patient  Acceptance, E, NR by MS at 11/13/2024 9784    Comment: role of PT in her care, spinal restrictions, use of LSO                                      User Key       Initials Effective Dates Name Provider Type Discipline    MS 07/11/23 -  Yuli Swain, PT, DPT, NCS Physical Therapist PT                  PT Recommendation and Plan  Planned Therapy Interventions (PT): balance training, bed mobility training, gait training, patient/family education, orthotic fitting/training, stair training, transfer training  Progress: no change  Outcome Evaluation: The patient presents drowsy but oriented x4 lying in bed. She reports thats she has been sleeping most of the day. She is currently on 3L of supplemental O2 when asleep. She does not typically wear O2 at home. She remembers 2/3 spinal restrictions. She demonstrates myoclonic like jerking during MMT which made testing difficult. This jerking was most likely due to her drowsiness. She woke up more as the evaluation progressed, but she was still not fully alert at the end of the evaluation. She was able to ambulate in the hallway with min A. She also progressed to RA as she increased her activity and became more alert. She demonstrates no focal neurological deficits in strength, sensation, or coordination. She will benefit from continued PT to work on increasing her activity and education for spinal restrictions. She plans to stay with her son for a few days upon discharge.     Time Calculation:         PT Charges       Row Name 11/13/24 1405             Time Calculation    Start Time 1405  5 min chart review  -MS      Stop Time 1440  -MS      Time Calculation (min) 35 min  -MS      PT Received On 11/13/24  -MS      PT Goal Re-Cert Due Date 11/23/24  -MS         Untimed Charges    PT Eval/Re-eval Minutes 40  -MS         Total Minutes    Untimed Charges Total Minutes 40  -MS       Total Minutes 40  -MS                User Key  (r) = Recorded By, (t) = Taken By, (c) = Cosigned  By      Initials Name Provider Type    Yuli Sheppard, PT, DPT, NCS Physical Therapist                      PT G-Codes  Outcome Measure Options: AM-PAC 6 Clicks Basic Mobility (PT)  AM-PAC 6 Clicks Score (PT): 17  AM-PAC 6 Clicks Score (OT): 17  PT Discharge Summary  Anticipated Discharge Disposition (PT): home with assist    Yuli Swain, PT, DPT, BLAYNE  11/13/2024

## 2024-11-13 NOTE — THERAPY EVALUATION
Patient Name: Roxann Mendenhall  : 1950    MRN: [id number]                              Today's Date: 2024       Admit Date: 2024    Visit Dx:     ICD-10-CM ICD-9-CM   1. Spondylolisthesis of lumbar region  M43.16 738.4     Patient Active Problem List   Diagnosis    Sacroiliitis    Spondylolisthesis of lumbar region    Lumbar radiculopathy    Spondylolisthesis     Past Medical History:   Diagnosis Date    Arthritis     DM (diabetes mellitus)     HTN (hypertension)     Low back pain     Neuropathy     Thyroid disease     Urinary urgency      Past Surgical History:   Procedure Laterality Date    BACK SURGERY      CATARACT EXTRACTION W/ INTRAOCULAR LENS IMPLANT Bilateral     CHOLECYSTECTOMY      HYSTERECTOMY      LUMBAR LAMINECTOMY WITH FUSION Bilateral 2024    Procedure: LUMBAR LAMINECTOMY TRANSFORAMINAL LUMBAR INTERBODY FUSION L4-5;  Surgeon: Shine Enriquez DO;  Location: [facility];  Service: Neurosurgery;  Laterality: Bilateral;    ROTATOR CUFF REPAIR Right     AND BICEP TENDON REPAIR    THYROIDECTOMY        General Information       Row Name 24 1037          OT Time and Intention    Subjective Information complains of;pain;fatigue  -KP     Document Type evaluation  s/p L4/5 transforaminal lumbar interbody fusion due to spondylolisthesis w/ R radicular pain and stenosis  -KP     Mode of Treatment occupational therapy  -     Patient Effort adequate  -KP     Symptoms Noted During/After Treatment fatigue;increased pain  -KP     Comment O2 dropping to 85%, able to recover with rest breaks  -KP       Row Name 24 1037          General Information    Patient Profile Reviewed yes  -KP     Prior Level of Function independent:;all household mobility;community mobility;transfer;ADL's;home management;driving  -KP     Existing Precautions/Restrictions LSO;brace worn when out of bed;fall;oxygen therapy device and L/min  -       Row Name 24 1037          Living Environment    People  in Home alone  -       Row Name 11/13/24 1037          Home Main Entrance    Number of Stairs, Main Entrance one  -     Stair Railings, Main Entrance none  -       Row Name 11/13/24 1037          Stairs Within Home, Primary    Number of Stairs, Within Home, Primary none  -[facility] Name 11/13/24 1037          Cognition    Orientation Status (Cognition) oriented x 4  -       Row Name 11/13/24 1037          Safety Issues/Impairments Affecting Functional Mobility    Safety Issues Affecting Function (Mobility) friction/shear risk;impulsivity;at risk behavior observed;judgment  -     Impairments Affecting Function (Mobility) balance;cognition;pain;shortness of breath;strength  -     Cognitive Impairments, Mobility Safety/Performance impulsivity;judgment;safety precaution awareness;safety precaution follow-through  -               User Key  (r) = Recorded By, (t) = Taken By, (c) = Cosigned By      Initials Name Provider Type     Kemi Tamez OTR/L Occupational Therapist                     Mobility/ADL's       Row Name 11/13/24 1037          Bed Mobility    Bed Mobility sidelying-sit-sidelying  -     Sidelying-Sit-Sidelying [place] (Bed Mobility) minimum assist (75% patient effort);1 person assist;verbal cues  -     Assistive Device (Bed Mobility) bed rails;head of bed elevated  -[facility] Name 11/13/24 1037          Transfers    Transfers bed-chair transfer  -[facility] Name 11/13/24 1037          Bed-Chair Transfer    Bed-Chair [place] (Transfers) minimum assist (75% patient effort);verbal cues;1 person assist  -     Assistive Device (Bed-Chair Transfers) other (see comments)  HHA  -     Comment, (Bed-Chair Transfer) Pt very shaky during transfer, required several attempts due to feeling like her legs would not hold her. Pt had been given toradol at 8am which could be reason for pt presenting very lethargic  -       Row Name 11/13/24 1037          Functional Mobility     Functional Mobility- Comment declined ambulating due to pain and lethargy  -KP       Row Name 11/13/24 1037          Activities of Daily Living    BADL Assessment/Intervention lower body dressing  -KP       Row Name 11/13/24 1037          Lower Body Dressing Assessment/Training    [place] Level (Lower Body Dressing) don;socks;maximum assist (25% patient effort)  -KP     Position (Lower Body Dressing) unsupported sitting  -KP               User Key  (r) = Recorded By, (t) = Taken By, (c) = Cosigned By      Initials Name Provider Type    Kemi Rosales OTR/L Occupational Therapist                   Obj/Interventions       Row Name 11/13/24 1037          Sensory Assessment (Somatosensory)    Sensory Assessment (Somatosensory) UE sensation intact  -[facility] Name 11/13/24 1037          Vision Assessment/Intervention    Visual Impairment/Limitations WNL  -[facility] Name 11/13/24 1037          Range of Motion Comprehensive    General Range of Motion bilateral upper extremity ROM WFL  -[facility] Name 11/13/24 1037          Strength Comprehensive (MMT)    Comment, General Manual Muscle Testing (MMT) Assessment grossly 4-/5 through shoulders, other UE joints WNL  -       Row Name 11/13/24 1037          Balance    Balance Assessment sitting static balance;sitting dynamic balance;standing static balance;standing dynamic balance  -KP     Static Sitting Balance modified independence  -KP     Dynamic Sitting Balance modified independence  -KP     Position, Sitting Balance unsupported;sitting edge of bed;sitting in chair  -KP     Static Standing Balance minimal assist;verbal cues  -KP     Dynamic Standing Balance minimal assist;verbal cues  -KP     Position/Device Used, Standing Balance other (see comments)  HHA  -KP               User Key  (r) = Recorded By, (t) = Taken By, (c) = Cosigned By      Initials Name Provider Type    Kemi Rosales OTR/L Occupational Therapist                   Goals/Plan        Row Name 11/13/24 1106          Transfer Goal 1 (OT)    Activity/Assistive Device (Transfer Goal 1, OT) toilet  -KP     [place] Level/Cues Needed (Transfer Goal 1, OT) contact guard required;verbal cues required  -KP     Time Frame (Transfer Goal 1, OT) long term goal (LTG);10 days  -KP     Progress/Outcome (Transfer Goal 1, OT) new goal  -KP       Row Name 11/13/24 1106          Dressing Goal 1 (OT)    Activity/Device (Dressing Goal 1, OT) dressing skills, all  -KP     [place]/Cues Needed (Dressing Goal 1, OT) contact guard required  -KP     Time Frame (Dressing Goal 1, OT) 10 days;long term goal (LTG)  -KP     Progress/Outcome (Dressing Goal 1, OT) new goal  -KP       Row Name 11/13/24 1106          Toileting Goal 1 (OT)    Activity/Device (Toileting Goal 1, OT) toileting skills, all  -KP     [place] Level/Cues Needed (Toileting Goal 1, OT) contact guard required  -KP     Time Frame (Toileting Goal 1, OT) 10 days;long term goal (LTG)  -KP     Progress/Outcome (Toileting Goal 1, OT) new goal  -KP       Row Name 11/13/24 1106          Problem Specific Goal 1 (OT)    Problem Specific Goal 1 (OT) Pt will independently implement one pain management technique to decrease pain and improve functional adl performance.  -KP     Time Frame (Problem Specific Goal 1, OT) long term goal (LTG);by discharge  -KP     Progress/Outcome (Problem Specific Goal 1, OT) new goal  -       Row Name 11/13/24 1106          Therapy Assessment/Plan (OT)    Planned Therapy Interventions (OT) transfer/mobility retraining;strengthening exercise;patient/caregiver education/training;occupation/activity based interventions;functional balance retraining;activity tolerance training;BADL retraining;adaptive equipment training  -KP               User Key  (r) = Recorded By, (t) = Taken By, (c) = Cosigned By      Initials Name Provider Type    Kemi Rosales, OTR/L Occupational Therapist                   Clinical Impression       Row  "Name 11/13/24 1027          Pain Assessment    Pretreatment Pain Rating 10/10  -     Posttreatment Pain Rating 8/10  -     Pain Location back;buttock;hip  -     Pain Side/Orientation right;lower  -     Pain Management Interventions exercise or physical activity utilized;positioning techniques utilized  -     Response to Pain Interventions activity participation with decreased pain  -     Pre/Posttreatment Pain Comment Pt states she has decreased pain in standing compared to sitting  -       Row Name 11/13/24 1027          Plan of Care Review    Plan of Care Reviewed With patient  -     Outcome Evaluation OT evluation completed. A&O x4, but presents with significant lethargy. Per nsg, pt given toradol at 8am due to spasms/pain through R buttocks/hip and back. Upon arrival, pt sitting in chair, reports pain 10/10 through same areas and requesting to get back in bed, LSO donned and supplemental O2 1L. Educated pt on LSO wearing schedule and spinal precautions. Pt completes sit<>stand several times with min A, sitting down a few times before being able to complete transfer due to her \"legs not holding her.\" Pt does completed transfer with min A and VCs. Pt BUE assessed EOB, ROM WFL, strength grossly 4-/5 through Michael shlds and other BUE WFL. Sensation intact, coordination intact. Requires max A to don/doff socks. Pt educated on log rolling technique, completes sit<>supine with min A. Pt situated on L side with pillows per pt request and adhering to spinal precautions. Pt left sidelying left in bed, call light within reach, O2 1L donned, nsg notified. OT to continue to see patient for transfer training, adl training, and overall balance/endurance/strength training. D/c home with 24/7 care and home health.  -       Row Name 11/13/24 1027          Therapy Assessment/Plan (OT)    Rehab Potential (OT) good  -     Criteria for Skilled Therapeutic Interventions Met (OT) yes;meets criteria  -     Therapy " Frequency (OT) 5 times/wk  -     Predicted Duration of Therapy Intervention (OT) 10 days  -       Row Name 11/13/24 1027          Therapy Plan Review/Discharge Plan (OT)    Equipment Needs Upon Discharge (OT) tub bench  -     Anticipated Discharge Disposition (OT) home with 24/7 care;home with home health;other (see comments)  pt reports planning to go to son's home for assistance, which therapist encouraged  -[facility] Name 11/13/24 1027          Positioning and Restraints    Pre-Treatment Position sitting in chair/recliner  -KP     Post Treatment Position bed  -KP     In Bed notified nsg;side lying left;call light within reach;encouraged to call for assist  -KP               User Key  (r) = Recorded By, (t) = Taken By, (c) = Cosigned By      Initials Name Provider Type    Kemi Rosales, OTR/L Occupational Therapist                   Outcome Measures       Row Name 11/13/24 1037          How much help from another is currently needed...    Putting on and taking off regular lower body clothing? 2  -KP     Bathing (including washing, rinsing, and drying) 2  -KP     Toileting (which includes using toilet bed pan or urinal) 2  -KP     Putting on and taking off regular upper body clothing 3  -KP     Taking care of personal grooming (such as brushing teeth) 4  -KP     Eating meals 4  -KP     AM-PAC 6 Clicks Score (OT) 17  -       Row Name 11/13/24 0800          How much help from another person do you currently need...    Turning from your back to your side while in flat bed without using bedrails? 3  -SS     Moving from lying on back to sitting on the side of a flat bed without bedrails? 3  -SS     Moving to and from a bed to a chair (including a wheelchair)? 3  -SS     Standing up from a chair using your arms (e.g., wheelchair, bedside chair)? 3  -SS     Climbing 3-5 steps with a railing? 2  -SS     To walk in hospital room? 3  -SS     AM-PAC 6 Clicks Score (PT) 17  -SS       Row Name 11/13/24 1037           Functional Assessment    Outcome Measure Options AM-PAC 6 Clicks Daily Activity (OT)  -               User Key  (r) = Recorded By, (t) = Taken By, (c) = Cosigned By      Initials Name Provider Type    Bruna Ambrose, RN Registered Nurse    Kemi Rosales OTR/L Occupational Therapist                    Occupational Therapy Education       Title: PT OT SLP Therapies (In Progress)       Topic: Occupational Therapy (In Progress)       Point: ADL training (Done)       Description:   Instruct learner(s) on proper safety adaptation and remediation techniques during self care or transfers.   Instruct in proper use of assistive devices.                  Learning Progress Summary            Patient Acceptance, E,D, VU,DU,NR by  at 11/13/2024 1108                      Point: Home exercise program (Not Started)       Description:   Instruct learner(s) on appropriate technique for monitoring, assisting and/or progressing therapeutic exercises/activities.                  Learner Progress:  Not documented in this visit.              Point: Precautions (Done)       Description:   Instruct learner(s) on prescribed precautions during self-care and functional transfers.                  Learning Progress Summary            Patient Acceptance, E,D, VU,DU,NR by  at 11/13/2024 1108                      Point: Body mechanics (Done)       Description:   Instruct learner(s) on proper positioning and spine alignment during self-care, functional mobility activities and/or exercises.                  Learning Progress Summary            Patient Acceptance, E,D, VU,DU,NR by  at 11/13/2024 1108                                      User Key       Initials Effective Dates Name Provider Type [facility] 10/08/24 -  Kemi Tamez OTR/L Occupational Therapist OT                  OT Recommendation and Plan  Planned Therapy Interventions (OT): transfer/mobility retraining, strengthening exercise, patient/caregiver  "education/training, occupation/activity based interventions, functional balance retraining, activity tolerance training, BADL retraining, adaptive equipment training  Therapy Frequency (OT): 5 times/wk  Plan of Care Review  Plan of Care Reviewed With: patient  Outcome Evaluation: OT evluation completed. A&O x4, but presents with significant lethargy. Per nsg, pt given toradol at 8am due to spasms/pain through R buttocks/hip and back. Upon arrival, pt sitting in chair, reports pain 10/10 through same areas and requesting to get back in bed, LSO donned and supplemental O2 1L. Educated pt on LSO wearing schedule and spinal precautions. Pt completes sit<>stand several times with min A, sitting down a few times before being able to complete transfer due to her \"legs not holding her.\" Pt does completed transfer with min A and VCs. Pt BUE assessed EOB, ROM WFL, strength grossly 4-/5 through Michael shlds and other BUE WFL. Sensation intact, coordination intact. Requires max A to don/doff socks. Pt educated on log rolling technique, completes sit<>supine with min A. Pt situated on L side with pillows per pt request and adhering to spinal precautions. Pt left sidelying left in bed, call light within reach, O2 1L donned, nsg notified. OT to continue to see patient for transfer training, adl training, and overall balance/endurance/strength training. D/c home with 24/7 care and home health.     Time Calculation:         Time Calculation- OT       Row Name 11/13/24 1108             Time Calculation- OT    OT Start Time 0955  -KP      OT Stop Time 1050  -KP      OT Time Calculation (min) 55 min  -KP      OT Received On 11/13/24  -KP      OT Goal Re-Cert Due Date 11/23/24  -KP         Untimed Charges    OT Eval/Re-eval Minutes 55  -KP         Total Minutes    Untimed Charges Total Minutes 55  -KP       Total Minutes 55  -KP                User Key  (r) = Recorded By, (t) = Taken By, (c) = Cosigned By      Initials Name Provider Type "     Kemi Tamez OTR/L Occupational Therapist                  Therapy Charges for Today       Code Description Service Date Service Provider Modifiers Qty    42250850131 HC OT EVAL MOD COMPLEXITY 4 11/13/2024 Kemi Tamez OTR/L GO 1                 OC Garcia/KISHORE  11/13/2024

## 2024-11-13 NOTE — PLAN OF CARE
Goal Outcome Evaluation:  Plan of Care Reviewed With: patient        Progress: no change  Outcome Evaluation: The patient presents drowsy but oriented x4 lying in bed. She reports thats she has been sleeping most of the day. She is currently on 3L of supplemental O2 when asleep. She does not typically wear O2 at home. She remembers 2/3 spinal restrictions. She demonstrates myoclonic like jerking during MMT which made testing difficult. This jerking was most likely due to her drowsiness. She woke up more as the evaluation progressed, but she was still not fully alert at the end of the evaluation. She was able to ambulate in the hallway with min A. She also progressed to RA as she increased her activity and became more alert. She demonstrates no focal neurological deficits in strength, sensation, or coordination. She will benefit from continued PT to work on increasing her activity and education for spinal restrictions. She plans to stay with her son for a few days upon discharge.    Anticipated Discharge Disposition (PT): home with assist

## 2024-11-14 VITALS
TEMPERATURE: 98.1 F | HEART RATE: 87 BPM | SYSTOLIC BLOOD PRESSURE: 110 MMHG | HEIGHT: 63 IN | OXYGEN SATURATION: 92 % | WEIGHT: 157.85 LBS | RESPIRATION RATE: 20 BRPM | DIASTOLIC BLOOD PRESSURE: 64 MMHG | BODY MASS INDEX: 27.97 KG/M2

## 2024-11-14 PROCEDURE — 99024 POSTOP FOLLOW-UP VISIT: CPT | Performed by: PHYSICIAN ASSISTANT

## 2024-11-14 RX ORDER — KETOROLAC TROMETHAMINE 10 MG/1
10 TABLET, FILM COATED ORAL EVERY 6 HOURS PRN
Qty: 12 TABLET | Refills: 0 | Status: SHIPPED | OUTPATIENT
Start: 2024-11-14

## 2024-11-14 RX ORDER — OXYCODONE AND ACETAMINOPHEN 7.5; 325 MG/1; MG/1
1 TABLET ORAL EVERY 6 HOURS PRN
Qty: 20 TABLET | Refills: 0 | Status: SHIPPED | OUTPATIENT
Start: 2024-11-14 | End: 2024-11-19

## 2024-11-14 RX ADMIN — ATORVASTATIN CALCIUM 40 MG: 40 TABLET, FILM COATED ORAL at 08:13

## 2024-11-14 RX ADMIN — OXYCODONE HYDROCHLORIDE AND ACETAMINOPHEN 1 TABLET: 7.5; 325 TABLET ORAL at 08:13

## 2024-11-14 RX ADMIN — CYCLOBENZAPRINE HYDROCHLORIDE 10 MG: 10 TABLET, FILM COATED ORAL at 08:12

## 2024-11-14 RX ADMIN — OXYCODONE HYDROCHLORIDE AND ACETAMINOPHEN 1 TABLET: 7.5; 325 TABLET ORAL at 00:37

## 2024-11-14 RX ADMIN — PANTOPRAZOLE SODIUM 40 MG: 40 TABLET, DELAYED RELEASE ORAL at 06:01

## 2024-11-14 RX ADMIN — LISINOPRIL 40 MG: 20 TABLET ORAL at 08:13

## 2024-11-14 RX ADMIN — HYDROCHLOROTHIAZIDE 25 MG: 25 TABLET ORAL at 08:13

## 2024-11-14 RX ADMIN — GABAPENTIN 300 MG: 300 CAPSULE ORAL at 08:12

## 2024-11-14 RX ADMIN — LEVOTHYROXINE SODIUM 88 MCG: 88 TABLET ORAL at 06:01

## 2024-11-14 RX ADMIN — METOPROLOL TARTRATE 25 MG: 25 TABLET, FILM COATED ORAL at 08:13

## 2024-11-14 RX ADMIN — EMPAGLIFLOZIN 25 MG: 25 TABLET, FILM COATED ORAL at 08:13

## 2024-11-14 RX ADMIN — AMLODIPINE BESYLATE 5 MG: 5 TABLET ORAL at 08:13

## 2024-11-14 NOTE — THERAPY DISCHARGE NOTE
Acute Care - Occupational Therapy Discharge Summary  [facility]     Patient Name: Roxann Mendenhall  : 1950  MRN: [id number]    Today's Date: 2024                 Admit Date: 2024        OT Recommendation and Plan    Visit Dx:    ICD-10-CM ICD-9-CM   1. Impaired mobility  Z74.09 799.89   2. Spondylolisthesis of lumbar region  M43.16 738.4                OT Rehab Goals       Row Name 24 1500             Transfer Goal 1 (OT)    Activity/Assistive Device (Transfer Goal 1, OT) toilet  -JJ      [place] Level/Cues Needed (Transfer Goal 1, OT) contact guard required;verbal cues required  -JJ      Time Frame (Transfer Goal 1, OT) long term goal (LTG);10 days  -JJ      Progress/Outcome (Transfer Goal 1, OT) goal not met;discharged from facility  -         Dressing Goal 1 (OT)    Activity/Device (Dressing Goal 1, OT) dressing skills, all  -JJ      [place]/Cues Needed (Dressing Goal 1, OT) contact guard required  -JJ      Time Frame (Dressing Goal 1, OT) 10 days;long term goal (LTG)  -JJ      Progress/Outcome (Dressing Goal 1, OT) goal not met;discharged from facility  -         Toileting Goal 1 (OT)    Activity/Device (Toileting Goal 1, OT) toileting skills, all  -JJ      [place] Level/Cues Needed (Toileting Goal 1, OT) contact guard required  -JJ      Time Frame (Toileting Goal 1, OT) 10 days;long term goal (LTG)  -JJ      Progress/Outcome (Toileting Goal 1, OT) goal not met;discharged from facility  -         Problem Specific Goal 1 (OT)    Problem Specific Goal 1 (OT) Pt will independently implement one pain management technique to decrease pain and improve functional adl performance.  -JJ      Time Frame (Problem Specific Goal 1, OT) long term goal (LTG);by discharge  -JJ      Progress/Outcome (Problem Specific Goal 1, OT) goal not met;discharged from facility  -                User Key  (r) = Recorded By, (t) = Taken By, (c) = Cosigned By      Initials Name Provider Type  Discipline    Erin Reyes OTR/L, CSRS Occupational Therapist OT                                OT Discharge Summary  Anticipated Discharge Disposition (OT): home with 24/7 care, home with home health, other (see comments)  Reason for Discharge: Discharge from facility  Outcomes Achieved: Refer to plan of care for updates on goals achieved  Discharge Destination: Home with assist, Home with home health      OC Zapata/L, CSRS  11/14/2024

## 2024-11-14 NOTE — PLAN OF CARE
Goal Outcome Evaluation:  Plan of Care Reviewed With: patient      Pt A&Ox4. VSS. Continues on RA when awake. However, requires 3-3.5L O2 via NC when sleeping. Pt was due to D/C 11/13, but felt she was not ready for discharge yet. Pt now due for D/C 11/14/24. Last BM 11/11/24. PRN pain medication given upon request. Surgical dressing to spine clean, dry, intact. No drainage noted. Call light within reach. Safety maintained. Will continue to monitor.

## 2024-11-14 NOTE — THERAPY DISCHARGE NOTE
Acute Care - Physical Therapy Discharge Summary  [facility]       Patient Name: Roxann Mendenhall  : 1950  MRN: [id number]    Today's Date: 2024                 Admit Date: 2024      PT Recommendation and Plan    Visit Dx:    ICD-10-CM ICD-9-CM   1. Impaired mobility  Z74.09 799.89   2. Spondylolisthesis of lumbar region  M43.16 738.4                PT Rehab Goals       Row Name 24 1617             Bed Mobility Goal 1 (PT)    Activity/Assistive Device (Bed Mobility Goal 1, PT) bed mobility activities, all  -LY      [place] Level/Cues Needed (Bed Mobility Goal 1, PT) independent  -LY      Time Frame (Bed Mobility Goal 1, PT) long term goal (LTG)  -LY      Progress/Outcomes (Bed Mobility Goal 1, PT) goal not met  -LY         Transfer Goal 1 (PT)    Activity/Assistive Device (Transfer Goal 1, PT) sit-to-stand/stand-to-sit;bed-to-chair/chair-to-bed  -LY      [place] Level/Cues Needed (Transfer Goal 1, PT) independent  -LY      Time Frame (Transfer Goal 1, PT) long term goal (LTG);by discharge  -LY      Progress/Outcome (Transfer Goal 1, PT) goal not met  -LY         Gait Training Goal 1 (PT)    Activity/Assistive Device (Gait Training Goal 1, PT) gait (walking locomotion);decrease fall risk;increase endurance/gait distance;improve balance and speed  -LY      [place] Level (Gait Training Goal 1, PT) independent  -LY      Distance (Gait Training Goal 1, PT) 200ft  -LY      Time Frame (Gait Training Goal 1, PT) long term goal (LTG);by discharge  -LY      Progress/Outcome (Gait Training Goal 1, PT) goal not met  -LY         Stairs Goal 1 (PT)    Activity/Assistive Device (Stairs Goal 1, PT) ascending stairs;descending stairs;using handrail, left;using handrail, right;step-to-step  -LY      [place] Level/Cues Needed (Stairs Goal 1, PT) modified independence  -LY      Number of Stairs (Stairs Goal 1, PT) 7  -LY      Time Frame (Stairs Goal 1, PT) long term goal (LTG);by discharge   -LY      Progress/Outcome (Stairs Goal 1, PT) goal not met  -LY         Problem Specific Goal 1 (PT)    Problem Specific Goal 1 (PT) The patient will independently implement 1 pain management technique to decrease pain in order to improve functional mobility.  -LY      Time Frame (Problem Specific Goal 1, PT) long-term goal (LTG);30 days  -LY      Progress/Outcome (Problem Specific Goal 1, PT) goal not met  -LY                User Key  (r) = Recorded By, (t) = Taken By, (c) = Cosigned By      Initials Name Provider Type Discipline    Leigha Andrews, PTA Physical Therapist Assistant PT                        PT Discharge Summary  Anticipated Discharge Disposition (PT): home with assist  Reason for Discharge: Discharge from facility  Outcomes Achieved: Refer to plan of care for updates on goals achieved  Discharge Destination: Home with assist      Leigha Cid PTA   11/14/2024

## 2024-11-14 NOTE — DISCHARGE SUMMARY
Date of Discharge:  11/14/2024    Discharge Diagnosis: Spondylolisthesis of lumbar region [M43.16]  Spondylolisthesis [M43.10]    Presenting Problem/History of Present Illness  Spondylolisthesis of lumbar region [M43.16]  Spondylolisthesis [M43.10]       Hospital Course  Patient is a 74 y.o. female presented with Spondylolisthesis of lumbar region [M43.16]  Spondylolisthesis [M43.10].  The patient has been through all manner of conservative care without any meaningful improvement. The patient went to the operating room on 11/12/24 for a L4/5 TLIF.  The patient tolerated procedure well.  Currently the patient is ambulating.  The patient is tolerating p.o.  The patient is voiding spontaneously.  It is felt that at this time the patient is stable and suitable to be discharged home.  See orders for discharge instructions and restrictions.  The patient can take the dressing off in 72 hours and can get the wound wet at that time.  The patient is to clean the wound daily with soap and water.  They are to call the office with any drainage from the incision or worsening pain.    Procedures Performed  Procedure(s):  LUMBAR LAMINECTOMY TRANSFORAMINAL LUMBAR INTERBODY FUSION L4-5       Consults: None  Consults       No orders found from 10/14/2024 to 11/13/2024.              Condition on Discharge: Stable    Vital Signs  Temp:  [97.8 °F (36.6 °C)-98.4 °F (36.9 °C)] 98.1 °F (36.7 °C)  Heart Rate:  [68-99] 87  Resp:  [14-20] 20  BP: ()/(40-64) 110/64    Physical Exam:   Physical Exam  Constitutional:       Appearance: Normal appearance. She is well-developed.   HENT:      Head: Normocephalic.   Eyes:      Pupils: Pupils are equal, round, and reactive to light.   Cardiovascular:      Rate and Rhythm: Normal rate.   Pulmonary:      Effort: Pulmonary effort is normal.   Musculoskeletal:         General: Normal range of motion.      Cervical back: Normal range of motion.   Skin:     General: Skin is warm and dry.    Neurological:      Mental Status: She is oriented to person, place, and time.      GCS: GCS eye subscore is 4. GCS verbal subscore is 5. GCS motor subscore is 6.      Cranial Nerves: No cranial nerve deficit.      Sensory: No sensory deficit.      Motor: Motor strength is normal.No weakness.      Deep Tendon Reflexes: Reflexes are normal and symmetric.   Psychiatric:         Speech: Speech normal.         Behavior: Behavior normal.         Thought Content: Thought content normal.          Neurological Exam  Mental Status  Awake, alert and oriented to person, place and time. Oriented to person, place, and time. Speech is normal.    Cranial Nerves  CN III, IV, VI: Pupils equal round and reactive to light bilaterally.    Motor   Strength is 5/5 throughout all four extremities.    Sensory  Sensation is intact to light touch, pinprick, vibration and proprioception in all four extremities.    Reflexes  Deep tendon reflexes are 2+ and symmetric in all four extremities.    Gait    Gait is steady.          Discharge Disposition  Home or Self Care    Discharge Medications     Discharge Medications        New Medications        Instructions Start Date   ketorolac 10 MG tablet  Commonly known as: TORADOL   10 mg, Oral, Every 6 Hours PRN      naloxone 4 MG/0.1ML nasal spray  Commonly known as: NARCAN   Call 911. Don't prime. [place] in 1 nostril for overdose. Repeat in 2-3 minutes in other nostril if no or minimal breathing/responsiveness.      oxyCODONE-acetaminophen 7.5-325 MG per tablet  Commonly known as: Percocet   1 tablet, Oral, Every 6 Hours PRN             Continue These Medications        Instructions Start Date   amLODIPine 5 MG tablet  Commonly known as: NORVASC   1 tablet, Oral, Daily      atorvastatin 40 MG tablet  Commonly known as: LIPITOR   40 mg, Oral, Daily      gabapentin 300 MG capsule  Commonly known as: NEURONTIN   300 mg, Oral, 3 Times Daily      Jardiance 25 MG tablet tablet  Generic drug:  empagliflozin   25 mg, Oral, Daily      levothyroxine 88 MCG tablet  Commonly known as: SYNTHROID, LEVOTHROID   88 mcg, Oral, Daily      lisinopril 20 MG tablet  Commonly known as: PRINIVIL,ZESTRIL   40 mg, Daily      metoprolol tartrate 25 MG tablet  Commonly known as: LOPRESSOR   1 tablet, Oral, Daily      omeprazole 40 MG capsule  Commonly known as: priLOSEC   40 mg, Oral, Daily             Stop These Medications      diclofenac 50 MG EC tablet  Commonly known as: VOLTAREN     HYDROcodone-acetaminophen 5-325 MG per tablet  Commonly known as: NORCO              Discharge Diet:   Diet Instructions       Diet: Regular/House Diet; Regular Texture (IDDSI 7); Thin (IDDSI 0)      Discharge Diet: Regular/House Diet    Texture: Regular Texture (IDDSI 7)    Fluid Consistency: Thin (IDDSI 0)            Activity at Discharge:   Activity Instructions       Other Instructions (Specify)      Activity Instructions: no strenuous activity. Wear brace when standing and walking. No lifting > 10 pounds. Avoid bending and twisting. No tub baths or hot tub.  No NSAIDs for 3 months.            Follow-up Appointments  Future Appointments   Date Time Provider Department Center   11/26/2024  2:45 PM Jayson Ramirez PA-C [facility]   1/6/2025  2:15 PM Shine Enriquez,  [facility]     Additional Instructions for the Follow-ups that You Need to Schedule       Call MD With Problems / Concerns   As directed      Call with worsening back or leg pain, drainage from wound, fever > 101,  difficulty walking, Shortness of breath, calf swelling or chest pain.    Order Comments: Call with worsening back or leg pain, drainage from wound, fever > 101,  difficulty walking, Shortness of breath, calf swelling or chest pain.         Discharge Follow-up with Specialty: Jayson Ramirez PA-C; 2 Weeks   As directed      Specialty: Jayson Ramirez PA-C   Follow Up: 2 Weeks                Test Results Pending at Discharge [facility] [place]  DANNY Ramirez  11/14/24  08:58 CST    Time: Discharge 20 min

## 2024-11-14 NOTE — DISCHARGE INSTRUCTIONS
No lifting > 10 pounds for total 6 months  Brace should be worn when standing and walking for 3 months  Avoid bending and and twisting  No tub baths or swimming until incisionsare completely healed.  Walking is encouraged.  You can drive if you have not had pain medication within previous 4 hours.  Call the office for any worsening of pain, calf pain and/or swelling or new weakness or other concerns.  You can shower tomorrow and remove dressing and leave incisions open to air.  If there is any drainage, cover with a dry dressing.

## 2024-11-14 NOTE — PLAN OF CARE
Goal Outcome Evaluation:           Progress: no change   Pt A/O x4. VSS on RA. IV removed. LSO Brace. D/c education completed. Pt went home with daughter[facility]

## 2024-11-26 ENCOUNTER — OFFICE VISIT (OUTPATIENT)
Dept: NEUROSURGERY | Facility: CLINIC | Age: 74
End: 2024-11-26
Payer: OTHER MISCELLANEOUS

## 2024-11-26 VITALS — HEIGHT: 63 IN | WEIGHT: 154 LBS | BODY MASS INDEX: 27.29 KG/M2

## 2024-11-26 DIAGNOSIS — E66.3 OVERWEIGHT WITH BODY MASS INDEX (BMI) OF 26 TO 26.9 IN ADULT: ICD-10-CM

## 2024-11-26 DIAGNOSIS — Z78.9 NONSMOKER: ICD-10-CM

## 2024-11-26 DIAGNOSIS — M43.16 SPONDYLOLISTHESIS OF LUMBAR REGION: Primary | ICD-10-CM

## 2024-11-26 PROCEDURE — 99024 POSTOP FOLLOW-UP VISIT: CPT | Performed by: PHYSICIAN ASSISTANT

## 2024-11-26 RX ORDER — OXYCODONE AND ACETAMINOPHEN 5; 325 MG/1; MG/1
1 TABLET ORAL EVERY 6 HOURS PRN
Qty: 20 TABLET | Refills: 0 | Status: SHIPPED | OUTPATIENT
Start: 2024-11-26 | End: 2024-12-01

## 2024-11-26 NOTE — PATIENT INSTRUCTIONS
No lifting > 10 pounds for total 6 months  Brace should be worn when standing and walking for 3 months  Avoid bending and and twisting  No tub baths or swimming until incisionsare completely healed.  Walking is encouraged.  You can drive if you have not had pain medication within previous 4 hours.  Call the office for any worsening of pain, calf pain and/or swelling or new weakness or other concerns.

## 2025-01-06 ENCOUNTER — HOSPITAL ENCOUNTER (OUTPATIENT)
Dept: GENERAL RADIOLOGY | Facility: HOSPITAL | Age: 75
Discharge: HOME | End: 2025-01-06
Admitting: PHYSICIAN ASSISTANT
Payer: OTHER MISCELLANEOUS

## 2025-01-06 ENCOUNTER — OFFICE VISIT (OUTPATIENT)
Dept: NEUROSURGERY | Facility: CLINIC | Age: 75
End: 2025-01-06
Payer: COMMERCIAL

## 2025-01-06 VITALS — HEIGHT: 63 IN | WEIGHT: 164 LBS | BODY MASS INDEX: 29.06 KG/M2

## 2025-01-06 DIAGNOSIS — E66.3 OVERWEIGHT WITH BODY MASS INDEX (BMI) OF 28 TO 28.9 IN ADULT: ICD-10-CM

## 2025-01-06 DIAGNOSIS — M54.16 LUMBAR RADICULOPATHY: ICD-10-CM

## 2025-01-06 DIAGNOSIS — Z78.9 NONSMOKER: ICD-10-CM

## 2025-01-06 DIAGNOSIS — M43.16 SPONDYLOLISTHESIS OF LUMBAR REGION: ICD-10-CM

## 2025-01-06 DIAGNOSIS — M43.16 SPONDYLOLISTHESIS OF LUMBAR REGION: Primary | ICD-10-CM

## 2025-01-06 PROCEDURE — 99024 POSTOP FOLLOW-UP VISIT: CPT | Performed by: NEUROLOGICAL SURGERY

## 2025-01-06 PROCEDURE — 72110 X-RAY EXAM L-2 SPINE 4/>VWS: CPT

## 2025-01-06 RX ORDER — ATORVASTATIN CALCIUM 80 MG/1
1 TABLET, FILM COATED ORAL DAILY
COMMUNITY

## 2025-01-06 RX ORDER — HYDROCODONE BITARTRATE AND ACETAMINOPHEN 5; 325 MG/1; MG/1
1 TABLET ORAL EVERY 6 HOURS PRN
Qty: 20 TABLET | Refills: 0 | Status: SHIPPED | OUTPATIENT
Start: 2025-01-06 | End: 2025-01-11

## 2025-01-06 RX ORDER — LEVOTHYROXINE SODIUM 100 UG/1
TABLET ORAL
COMMUNITY

## 2025-01-06 RX ORDER — GABAPENTIN 600 MG/1
TABLET ORAL
COMMUNITY
Start: 2024-12-31

## 2025-01-06 RX ORDER — GLIPIZIDE 5 MG/1
1 TABLET, FILM COATED, EXTENDED RELEASE ORAL DAILY
COMMUNITY

## 2025-01-06 RX ORDER — HYDROCODONE BITARTRATE AND ACETAMINOPHEN 5; 325 MG/1; MG/1
TABLET ORAL
COMMUNITY
Start: 2024-12-03

## 2025-01-06 RX ORDER — METHYLPREDNISOLONE 4 MG/1
TABLET ORAL
Qty: 21 TABLET | Refills: 0 | Status: SHIPPED | OUTPATIENT
Start: 2025-01-06

## 2025-01-06 NOTE — PROGRESS NOTES
"    Chief complaint:   Chief Complaint   Patient presents with    Post-op     LUMBAR LAMINECTOMY TRANSFORAMINAL LUMBAR INTERBODY FUSION L4-5 on 11/12/2024.  Patient         Subjective     HPI: I had a chance to see Roxann today in follow-up.  Her back pain has improved significantly since surgery she does have some paresthesias down the right leg which is the leg that had the pain prior to surgery.  We will try some steroid to see if it helps at all.    Review of Systems      Objective      Vital Signs  Ht 161 cm (63.39\")   BMI 26.95 kg/m²     Physical Exam  Eyes:      General: Lids are normal.      Extraocular Movements: Extraocular movements intact.   Neurological:      Motor: Motor strength is normal.  Psychiatric:         Speech: Speech normal.         Neurological Exam  Mental Status  Awake, alert and oriented to person, place and time. Oriented to person, place and time. Speech is normal. Language is fluent with no aphasia. Attention and concentration are normal. Fund of knowledge is appropriate for level of education.    Cranial Nerves  CN III, IV, VI: Extraocular movements intact bilaterally. Normal lids and orbits bilaterally.    Motor   No abnormal involuntary movements. Strength is 5/5 throughout all four extremities.    Gait  Casual gait is normal including stance, stride, and arm swing.       Imaging review: XR SPINE LUMBAR AP AND LAT W FLEX AND EXT-     HISTORY: spondylolisthesis lumbar region; M43.16-Spondylolisthesis,  lumbar region     COMPARISON: Outside MRI 6/9/2023     FINDINGS: Frontal, lateral views of the lumbar spine obtained with  lateral flexion and extension views.     Interval posterior interbody fusion at the L4/5 level with similar bony  fusion across L5-S1. Mild anterolisthesis of L4 over L5 measuring only 3  to 4 mm is similar with flexion and extension. Bony hemangiomas present  within the T12 and L3 vertebra, better seen on the outside MRI exam. No  acute compression deformity. " Moderate degenerative disc change at T11/12  and T12-L1.     Right upper quadrant surgical clips. Mild vascular calcifications.  Nonobstructive bowel gas pattern.     IMPRESSION:  1. Posterior interbody fusion at L4/5. Mild grade 1 anterolisthesis of  L4 over L5 is stable with movement. No dynamic instability. No acute  radiographic abnormality.           Assessment/Plan:   Status post transforaminal lumbar interbody fusion L4/5    Patient is doing well and her x-rays show all of her instrumentation is intact without evidence of loosening or failure.  I would like to follow-up with her in 8 weeks to check on her progress.  I look forward to seeing her at her next visit.    Patient is a nonsmoker  The patient's Body mass index is 26.95 kg/m².. BMI is above normal parameters. Recommendations include: continue with current weight loss program    Diagnoses and all orders for this visit:    1. Spondylolisthesis of lumbar region (Primary)    2. Lumbar radiculopathy    3. Nonsmoker        I discussed the patients findings and my recommendations with patient  Shine Enriquez DO  01/06/25  13:33 CST

## 2025-01-06 NOTE — PATIENT INSTRUCTIONS
"PATIENT TO CONTINUE TO FOLLOW UP WITH HER PRIMARY CARE PROVIDER FOR YEARLY PHYSICAL EXAMS TO ENSURE COMPLETE HEALTH MAINTENANCE      BMI for Adults  Body mass index (BMI) is a number found using a person's weight and height. BMI can help tell how much of a person's weight is made up of fat. BMI does not measure body fat directly. It is used instead of tests that directly measure body fat, which can be difficult and expensive.  What are BMI measurements used for?  BMI is useful to:  Find out if your weight puts you at higher risk for medical problems.  Help recommend changes, such as in diet and exercise. This can help you reach a healthy weight. BMI screening can be done again to see if these changes are working.  How is BMI calculated?  Your height and weight are measured. The BMI is found from those numbers. This can be done with [place] or metric measurements. Note that charts and online BMI calculators are available to help you find your BMI quickly and easily without doing these calculations.  To calculate your BMI in [place] measurements:  Measure your weight in pounds (lb).  Multiply the number of pounds by 703.  So, for an adult who weighs 150 lb, multiply that number by 703: 150 x 703, which equals 105,450.  Measure your height in inches. Then multiply that number by itself to get a measurement called \"inches squared.\"  So, for an adult who is 70 inches tall, the \"inches squared\" measurement is 70 inches x 70 inches, which equals 4,900 inches squared.  Divide the total from step 2 (number of lb x 703) by the total from step 3 (inches squared): 105,450 ÷ 4,900 = 21.5. This is your BMI.  To calculate your BMI in metric measurements:    Measure your weight in kilograms (kg).  For this example, the weight is 70 kg.  Measure your height in meters (m). Then multiply that number by itself to get a measurement called \"meters squared.\"  So, for an adult who is 1.75 m tall, the \"meters squared\" measurement is 1.75 m x 1.75 " m, which equals 3.1 meters squared.  Divide the number of kilograms (your weight) by the meters squared number. In this example: 70 ÷ 3.1 = 22.6. This is your BMI.  What do the results mean?  BMI charts are used to see if you are underweight, normal weight, overweight, or obese. The following guidelines will be used:  Underweight: BMI less than 18.5.  Normal weight: BMI between 18.5 and 24.9.  Overweight: BMI between 25 and 29.9.  Obese: BMI of 30 or above.  BMI is a tool and cannot diagnose a condition. Talk with your health care provider about what your BMI means for you. Keep these notes in mind:  Weight includes fat and muscle. Someone with a muscular build, such as an athlete, may have a BMI that is higher than 24.9. In cases like these, BMI is not a correct measure of body fat.  If you have a BMI of 25 or higher, your provider may need to do more testing to find out if excess body fat is the cause.  BMI is measured the same way for males and females. Females usually have more body fat than males of the same height and weight.  Where to find more information  For more information about BMI, including tools to quickly find your BMI, go to:  Centers for Disease Control and Prevention: cdc.gov  American Heart Association: heart.org  National Heart, Lung, and Blood [place]: nhlbi.nih.gov  This information is not intended to replace advice given to you by your health care provider. Make sure you discuss any questions you have with your health care provider.  Document Revised: 09/07/2023 Document Reviewed: 08/31/2023  [employer] Patient Education © 2024 [employer]  DASH Eating Plan  DASH stands for Dietary Approaches to Stop Hypertension. The DASH eating plan is a healthy eating plan that has been shown to:  Lower high blood pressure (hypertension).  Reduce your risk for type 2 diabetes, heart disease, and stroke.  Help with weight loss.  What are tips for following this plan?  Reading food labels  Check food  "labels for the amount of salt (sodium) per serving. Choose foods with less than 5 percent of the Daily Value (DV) of sodium. In general, foods with less than 300 milligrams (mg) of sodium per serving fit into this eating plan.  To find whole grains, look for the word \"whole\" as the first word in the ingredient list.  Shopping  Buy products labeled as \"low-sodium\" or \"no salt added.\"  Buy fresh foods. Avoid canned foods and pre-made or frozen meals.  Cooking  Try not to add salt when you cook. Use salt-free seasonings or herbs instead of table salt or sea salt. Check with your health care provider or pharmacist before using salt substitutes.  Do not warren foods. Cook foods in healthy ways, such as baking, boiling, grilling, roasting, or broiling.  Cook using oils that are good for your heart. These include olive, canola, avocado, soybean, and sunflower oil.  Meal planning    Eat a balanced diet. This should include:  4 or more servings of fruits and 4 or more servings of vegetables each day. Try to fill half of your plate with fruits and vegetables.  6-8 servings of whole grains each day.  6 or less servings of lean meat, poultry, or fish each day. 1 oz is 1 serving. A 3 oz (85 g) serving of meat is about the same size as the palm of your hand. One egg is 1 oz (28 g).  2-3 servings of low-fat dairy each day. One serving is 1 cup (237 mL).  1 serving of nuts, seeds, or beans 5 times each week.  2-3 servings of heart-healthy fats. Healthy fats called omega-3 fatty acids are found in foods such as walnuts, flaxseeds, fortified milks, and eggs. These fats are also found in cold-water fish, such as sardines, salmon, and mackerel.  Limit how much you eat of:  Canned or prepackaged foods.  Food that is high in trans fat, such as fried foods.  Food that is high in saturated fat, such as fatty meat.  Desserts and other sweets, sugary drinks, and other foods with added sugar.  Full-fat dairy products.  Do not salt foods before " eating.  Do not eat more than 4 egg yolks a week.  Try to eat at least 2 vegetarian meals a week.  Eat more home-cooked food and less restaurant, buffet, and fast food.  Lifestyle  When eating at a restaurant, ask if your food can be made with less salt or no salt.  If you drink alcohol:  Limit how much you have to:  0-1 drink a day if you are female.  0-2 drinks a day if you are male.  Know how much alcohol is in your drink. In the [place] one drink is one 12 oz bottle of beer (355 mL), one 5 oz glass of wine (148 mL), or one 1½ oz glass of hard liquor (44 mL).  General information  Avoid eating more than 2,300 mg of salt a day. If you have hypertension, you may need to reduce your sodium intake to 1,500 mg a day.  Work with your provider to stay at a healthy body weight or lose weight. Ask what the best weight range is for you.  On most days of the week, get at least 30 minutes of exercise that causes your heart to beat faster. This may include walking, swimming, or biking.  Work with your provider or dietitian to adjust your eating plan to meet your specific calorie needs.  What foods should I eat?  Fruits  All fresh, dried, or frozen fruit. Canned fruits that are in their natural juice and do not have sugar added to them.  Vegetables  Fresh or frozen vegetables that are raw, steamed, roasted, or grilled. Low-sodium or reduced-sodium tomato and vegetable juice. Low-sodium or reduced-sodium tomato sauce and tomato paste. Low-sodium or reduced-sodium canned vegetables.  Grains  Whole-grain or whole-wheat bread. Whole-grain or whole-wheat pasta. Brown rice. Oatmeal. Quinoa. Bulgur. Whole-grain and low-sodium cereals. [place] bread. Low-fat, low-sodium crackers. Whole-wheat flour tortillas.  Meats and other proteins  Skinless chicken or turkey. Ground chicken or turkey. Pork with fat trimmed off. Fish and seafood. Egg whites. Dried beans, peas, or lentils. Unsalted nuts, nut butters, and seeds. Unsalted canned beans. Lean  cuts of beef with fat trimmed off. Low-sodium, lean precooked or cured meat, such as sausages or meat loaves.  Dairy  Low-fat (1%) or fat-free (skim) milk. Reduced-fat, low-fat, or fat-free cheeses. Nonfat, low-sodium ricotta or cottage cheese. Low-fat or nonfat yogurt. Low-fat, low-sodium cheese.  Fats and oils  Soft margarine without trans fats. Vegetable oil. Reduced-fat, low-fat, or light mayonnaise and salad dressings (reduced-sodium). Canola, safflower, olive, avocado, soybean, and sunflower oils. Avocado.  Seasonings and condiments  Herbs. Spices. Seasoning mixes without salt.  Other foods  Unsalted popcorn and pretzels. Fat-free sweets.  The items listed above may not be all the foods and drinks you can have. Talk to a dietitian to learn more.  What foods should I avoid?  Fruits  Canned fruit in a light or heavy syrup. Fried fruit. Fruit in cream or butter sauce.  Vegetables  Creamed or fried vegetables. Vegetables in a cheese sauce. Regular canned vegetables that are not marked as low-sodium or reduced-sodium. Regular canned tomato sauce and paste that are not marked as low-sodium or reduced-sodium. Regular tomato and vegetable juices that are not marked as low-sodium or reduced-sodium. Pickles. Olives.  Grains  Baked goods made with fat, such as croissants, muffins, or some breads. Dry pasta or rice meal packs.  Meats and other proteins  Fatty cuts of meat. Ribs. Fried meat. Sal. Bologna, salami, and other precooked or cured meats, such as sausages or meat loaves, that are not lean and low in sodium. Fat from the back of a pig (fatback). Bratwurst. Salted nuts and seeds. Canned beans with added salt. Canned or smoked fish. Whole eggs or egg yolks. Chicken or turkey with skin.  Dairy  Whole or 2% milk, cream, and half-and-half. Whole or full-fat cream cheese. Whole-fat or sweetened yogurt. Full-fat cheese. Nondairy creamers. Whipped toppings. Processed cheese and cheese spreads.  Fats and oils  Butter.  Stick margarine. Lard. Shortening. Ghee. Sal fat. Tropical oils, such as coconut, palm kernel, or palm oil.  Seasonings and condiments  Onion salt, garlic salt, seasoned salt, table salt, and sea salt. Worcestershire sauce. Tartar sauce. Barbecue sauce. Teriyaki sauce. Soy sauce, including reduced-sodium soy sauce. Steak sauce. Canned and packaged gravies. Fish sauce. Oyster sauce. Cocktail sauce. Store-bought horseradish. Ketchup. Mustard. Meat flavorings and tenderizers. Bouillon cubes. Hot sauces. Pre-made or packaged marinades. Pre-made or packaged taco seasonings. Relishes. Regular salad dressings.  Other foods  Salted popcorn and pretzels.  The items listed above may not be all the foods and drinks you should avoid. Talk to a dietitian to learn more.  Where to find more information  National Heart, Lung, and Blood [place] (NHLBI): nhlbi.nih.gov  American Heart Association (AHA): heart.org  Academy of Nutrition and Dietetics: eatright.org  National Kidney Foundation (NKF): kidney.org  This information is not intended to replace advice given to you by your health care provider. Make sure you discuss any questions you have with your health care provider.  Document Revised: 01/04/2024 Document Reviewed: 01/04/2024  [employer] Patient Education © 2024 [employer]

## 2025-03-07 ENCOUNTER — OFFICE VISIT (OUTPATIENT)
Dept: NEUROSURGERY | Facility: CLINIC | Age: 75
End: 2025-03-07
Payer: OTHER MISCELLANEOUS

## 2025-03-07 VITALS — BODY MASS INDEX: 28.69 KG/M2 | HEIGHT: 63 IN

## 2025-03-07 DIAGNOSIS — M43.16 SPONDYLOLISTHESIS OF LUMBAR REGION: ICD-10-CM

## 2025-03-07 DIAGNOSIS — M54.16 LUMBAR RADICULOPATHY: Primary | ICD-10-CM

## 2025-03-07 PROCEDURE — 99024 POSTOP FOLLOW-UP VISIT: CPT | Performed by: NEUROLOGICAL SURGERY

## 2025-03-07 NOTE — PROGRESS NOTES
"Follow-up 2 to 3 months with a new CT scan of the lumbar spine    Chief complaint:   Chief Complaint   Patient presents with    Follow-up     Follow up 4 month s/p TLIF (11/12/24), numbness right leg        Subjective     HPI: I had a chance to see Roxann today in follow-up.  Her back pain and hip pain are doing much better however she still has some pain in the leg which she is a numbing type pain and is different than prior to surgery as she had horrible pain in the leg prior to surgery.  I do think that the nerve is likely healing and we will keep her on her gabapentin for now.  I would like to get a CT scan of her lumbar spine as she is quite osteoporotic and I want to make sure we are not wearing out any screws.    Review of Systems      Objective      Vital Signs  Ht 161 cm (63.39\")   BMI 28.69 kg/m²     Physical Exam  Eyes:      General: Lids are normal.      Extraocular Movements: Extraocular movements intact.   Neurological:      Motor: Motor strength is normal.  Psychiatric:         Speech: Speech normal.         Neurological Exam  Mental Status  Awake, alert and oriented to person, place and time. Oriented to person, place and time. Speech is normal. Language is fluent with no aphasia. Attention and concentration are normal. Fund of knowledge is appropriate for level of education.    Cranial Nerves  CN III, IV, VI: Extraocular movements intact bilaterally. Normal lids and orbits bilaterally.    Motor   No abnormal involuntary movements. Strength is 5/5 throughout all four extremities.    Gait  Casual gait is normal including stance, stride, and arm swing.       Imaging review: No new imaging studies          Assessment/Plan:   Status post foraminal lumbar interbody fusion, still having some back pain and we will get a new CT scan of the lumbar spine to check all of the instrumentation and make sure that she is not developing a pseudoarthrosis.  I look forward to seeing her at her next visit.    Patient " is a nonsmoker  The patient's Body mass index is 28.69 kg/m².. BMI is above normal parameters. Recommendations include: continue with current weight loss program    Diagnoses and all orders for this visit:    1. Lumbar radiculopathy (Primary)    2. Spondylolisthesis of lumbar region        I discussed the patients findings and my recommendations with patient  Shine Enriquez DO  03/07/25  13:19 CST

## 2025-04-04 ENCOUNTER — TELEPHONE (OUTPATIENT)
Dept: NEUROSURGERY | Facility: CLINIC | Age: 75
End: 2025-04-04
Payer: COMMERCIAL

## 2025-04-04 NOTE — TELEPHONE ENCOUNTER
CALLED PATIENT IN REGARDS TO FOLLOW UP. PT IS TO HAVE CT SCAN DONE PRIOR TO APPT ON WEDNESDAY IF NOT ABLE TO GET SCHEDULED WE WILL NEED TO R.S APPT.     It is okay for the hub to relay the message to the patient and schedule if possible

## 2025-04-30 ENCOUNTER — HOSPITAL ENCOUNTER (OUTPATIENT)
Dept: CT IMAGING | Facility: HOSPITAL | Age: 75
Discharge: HOME | End: 2025-04-30
Admitting: NEUROLOGICAL SURGERY
Payer: OTHER MISCELLANEOUS

## 2025-04-30 DIAGNOSIS — M54.16 LUMBAR RADICULOPATHY: ICD-10-CM

## 2025-04-30 DIAGNOSIS — M43.16 SPONDYLOLISTHESIS OF LUMBAR REGION: ICD-10-CM

## 2025-04-30 PROCEDURE — 72131 CT LUMBAR SPINE W/O DYE: CPT

## 2025-05-15 ENCOUNTER — HOSPITAL ENCOUNTER (OUTPATIENT)
Dept: GENERAL RADIOLOGY | Facility: HOSPITAL | Age: 75
Discharge: HOME | End: 2025-05-15
Admitting: PHYSICIAN ASSISTANT
Payer: OTHER MISCELLANEOUS

## 2025-05-15 ENCOUNTER — OFFICE VISIT (OUTPATIENT)
Dept: NEUROSURGERY | Facility: CLINIC | Age: 75
End: 2025-05-15
Payer: OTHER MISCELLANEOUS

## 2025-05-15 ENCOUNTER — TELEPHONE (OUTPATIENT)
Dept: NEUROSURGERY | Facility: CLINIC | Age: 75
End: 2025-05-15
Payer: COMMERCIAL

## 2025-05-15 ENCOUNTER — HOSPITAL ENCOUNTER (OUTPATIENT)
Dept: GENERAL RADIOLOGY | Facility: HOSPITAL | Age: 75
Discharge: HOME | End: 2025-05-15
Payer: COMMERCIAL

## 2025-05-15 VITALS — WEIGHT: 167.8 LBS | BODY MASS INDEX: 29.73 KG/M2 | HEIGHT: 63 IN

## 2025-05-15 DIAGNOSIS — M25.551 RIGHT HIP PAIN: ICD-10-CM

## 2025-05-15 DIAGNOSIS — Z78.9 NONSMOKER: ICD-10-CM

## 2025-05-15 DIAGNOSIS — E66.3 OVERWEIGHT WITH BODY MASS INDEX (BMI) OF 29 TO 29.9 IN ADULT: ICD-10-CM

## 2025-05-15 DIAGNOSIS — M54.16 LUMBAR RADICULOPATHY: ICD-10-CM

## 2025-05-15 DIAGNOSIS — M43.16 SPONDYLOLISTHESIS OF LUMBAR REGION: Primary | ICD-10-CM

## 2025-05-15 PROCEDURE — 73502 X-RAY EXAM HIP UNI 2-3 VIEWS: CPT

## 2025-05-15 RX ORDER — BLOOD-GLUCOSE METER
KIT MISCELLANEOUS
COMMUNITY
Start: 2025-05-13

## 2025-05-15 RX ORDER — HYDROCHLOROTHIAZIDE 25 MG/1
TABLET ORAL
COMMUNITY
Start: 2025-04-02

## 2025-05-15 RX ORDER — EZETIMIBE 10 MG/1
10 TABLET ORAL DAILY
COMMUNITY

## 2025-05-15 NOTE — TELEPHONE ENCOUNTER
Message left: Right hip x-ray showed arthritis. Nothing that needs urgent ortho evaluation, but if she would like to get set up, we can get Ortho evaluation arranged.

## 2025-05-15 NOTE — LETTER
May 15, 2025     Patient: Roxann Mendenhall   YOB: 1950   Date of Visit: 5/15/2025       To Whom It May Concern:    It is my medical opinion that Roxann Mendenhall will continue off work until released.            Sincerely,        Jayson Ramirez PA-C    CC: No Recipients

## 2025-05-15 NOTE — PROGRESS NOTES
"    Chief complaint:   Chief Complaint   Patient presents with    Follow-up     Pt reports to office for follow up after CT- states her back is doing fine having numbness and tingling in right leg.        Subjective     HPI: Roxann comes in today for recheck.  She is 6-month status post L4-L5 TLIF.  She continues with right L5 radicular pain and paresthesias.  She states that sometimes pain shoots to the big toe like an electric shock.  She feels that her pain is 50% better since surgery.  She has no back pain at all.  She continues on gabapentin 600 mg 3 times daily.  She reports the pain is constant but it worsens with standing and walking.  She has been compliant with her restrictions.  She recently had LESI at [facility] that provided 0 symptom improvement.    Review of Systems      Objective      Vital Signs  Ht 161 cm (63.39\")   Wt 76.1 kg (167 lb 12.8 oz)   BMI 29.36 kg/m²     Physical Exam  Constitutional:       Appearance: Normal appearance. She is well-developed.   HENT:      Head: Normocephalic.   Eyes:      Pupils: Pupils are equal, round, and reactive to light.   Cardiovascular:      Rate and Rhythm: Normal rate.   Pulmonary:      Effort: Pulmonary effort is normal.   Musculoskeletal:         General: Tenderness present. Normal range of motion.      Cervical back: Normal range of motion.      Comments: Significant tenderness with internal rotation right hip   Skin:     General: Skin is warm and dry.   Neurological:      Mental Status: She is oriented to person, place, and time.      GCS: GCS eye subscore is 4. GCS verbal subscore is 5. GCS motor subscore is 6.      Cranial Nerves: No cranial nerve deficit.      Sensory: Sensory deficit present.      Motor: Motor strength is normal.No weakness.      Gait: Gait abnormal.      Deep Tendon Reflexes: Reflexes are normal and symmetric.   Psychiatric:         Speech: Speech normal.         Behavior: Behavior normal.         Thought Content: Thought content " normal.         Neurological Exam  Mental Status  Awake, alert and oriented to person, place and time. Oriented to person, place, and time. Speech is normal.    Cranial Nerves  CN III, IV, VI: Pupils equal round and reactive to light bilaterally.    Motor  Decreased muscle bulk throughout. Strength is 5/5 throughout all four extremities.    Sensory  Right: There is a sensory level at L5.    Reflexes  Deep tendon reflexes are 2+ and symmetric in all four extremities.    Gait   Abnormal gait.  Gait is antalgic, right lower extremity affected.       Imaging review: CT Lumbar Spine Without Contrast  Result Date: 4/30/2025  1. Postoperative changes L4-5 with grade 1 anterolisthesis L4 on L5. Metallic streak artifact partially limits evaluation. No evidence of hardware loosening. 2. No convincing severe spinal canal or foraminal stenosis.  This report was signed and finalized on 4/30/2025 4:28 PM by Dr Zakia Davis MD.       There has been some graft subsidence on the right into the superior endplate of L5.  There does appear to be some bony bridging through the implant on the left.  No haloing around the screws or evidence of hardware fracture.  She does appear to be fusing through the facets on the left at L4-L5        Assessment/Plan: Roxann continues with L5 radicular pain and paresthesias.  Pain continues to improve but it is improving slowly.  She estimates she is 50% better than preop.  She has no back pain at all.  Prior to surgery her back pain was severe to the point she was in tears.  I walked her out of the office and noticed a significant limp, right lower extremity affected.  She does have some right groin pain with internal/external rotation.  She states that after the original injury, they initially thought that her right hip was fractured.  It was x-rayed and negative at that time.    I would like to get some right hip x-rays on her way out today.  I will call her with results.    She will continue 10  pound lifting restriction and avoid bending and twisting.  She will continue gabapentin as current she also has Norco on hand.  [facility] refilled her pain medication yesterday.    Given the fact that she is still having considerable leg pain although it is improved, I would like to keep her off work.    Follow-up here with Dr. Enriquez in 8 weeks.    If she has any worsening pain, weakness or other issues or concerns to her that appointment, she will call for sooner visit.    Patient is a nonsmoker    The patient's Body mass index is 29.36 kg/m².. BMI is above normal parameters. Recommendations include: educational material    ADVANCED CARE PLANNING  Information on advance directives provided in AVS.    SCOTADI Fall Risk Assessment has not been completed.     Diagnoses and all orders for this visit:    1. Spondylolisthesis of lumbar region (Primary)    2. Lumbar radiculopathy    3. Right hip pain  -     XR pelvis 1 or 2 vw; Future  -     XR Hip With or Without Pelvis 2 - 3 View Right; Future    4. Nonsmoker    5. Overweight with body mass index (BMI) of 29 to 29.9 in adult    I spent 32 minutes caring for Roxann on this date of service. This time includes time spent by me in the following activities: preparing for the visit, reviewing tests, obtaining and/or reviewing a separately obtained history, performing a medically appropriate examination and/or evaluation, counseling and educating the patient/family/caregiver, ordering medications, tests, or procedures, referring and communicating with other health care professionals, documenting information in the medical record, independently interpreting results and communicating that information with the patient/family/caregiver, and care coordination.      I discussed the patients findings and my recommendations with patient  Jayson Ramirez PA-C  05/15/25  11:21 CDT

## 2025-08-04 ENCOUNTER — OFFICE VISIT (OUTPATIENT)
Dept: NEUROSURGERY | Facility: CLINIC | Age: 75
End: 2025-08-04
Payer: OTHER MISCELLANEOUS

## 2025-08-04 VITALS — HEIGHT: 63 IN | WEIGHT: 167 LBS | BODY MASS INDEX: 29.59 KG/M2

## 2025-08-04 DIAGNOSIS — M43.16 SPONDYLOLISTHESIS OF LUMBAR REGION: Primary | ICD-10-CM

## 2025-08-04 DIAGNOSIS — M54.16 LUMBAR RADICULOPATHY: ICD-10-CM

## 2025-08-04 DIAGNOSIS — E66.3 OVERWEIGHT WITH BODY MASS INDEX (BMI) OF 29 TO 29.9 IN ADULT: ICD-10-CM

## (undated) DEVICE — PK SPINE POST 30

## (undated) DEVICE — PENCL ES MEGADINE EZ/CLEAN BUTN W/HOLSTR 10FT

## (undated) DEVICE — ELECTRD BLD EZ CLN MOD 4IN

## (undated) DEVICE — APPL DURAPREP IODOPHOR APL 26ML

## (undated) DEVICE — NEEDLE, QUINCKE, 18GX3.5": Brand: MEDLINE

## (undated) DEVICE — INSTRUMENT 8670001 SXT GUIDEWIRE BLUNT

## (undated) DEVICE — CONN FLX BREATHE CIRCT

## (undated) DEVICE — SUT NUROLON 0 CT1 CR8 18IN C521D

## (undated) DEVICE — INSTRUMENT 8675424 LG DISPOSABLE DILATOR

## (undated) DEVICE — GLV SURG DERMASSURE GRN LF PF 8.5

## (undated) DEVICE — SPK10277 JACKSON/PRO-AXIS KIT: Brand: SPK10277 JACKSON/PRO-AXIS KIT

## (undated) DEVICE — PROXIMATE RH ROTATING HEAD SKIN STAPLERS (35 REGULAR) CONTAINS 35 STAINLESS STEEL STAPLES: Brand: PROXIMATE

## (undated) DEVICE — GLV SURG SENSICARE W/ALOE PF LF 8.5 STRL

## (undated) DEVICE — ELECTRD BLD EZ CLN MOD XLNG 2.75IN

## (undated) DEVICE — PAD,NON-ADHERENT,3X8,STERILE,LF,1/PK: Brand: MEDLINE

## (undated) DEVICE — DEV ACC PEDCL BEST/PAK BVL W/1/TROC W/1/BVL/TP

## (undated) DEVICE — SUT VIC 2/0 SH CR8 27IN VCP785D

## (undated) DEVICE — 4-PORT MANIFOLD: Brand: NEPTUNE 2

## (undated) DEVICE — CLTH CLENS READYCLEANSE PERI CARE PK/5

## (undated) DEVICE — GLV SURG BIOGEL LTX PF 6 1/2

## (undated) DEVICE — 3.0MM PRECISION NEURO (MATCH HEAD)

## (undated) DEVICE — THE STERILE THE STERIS STERILE CAMERA HANDLE COVERS ARE DESIGNED FOR HARMONYAIR 4K CAMERA MODULE, AND PROVIDE STERILE CONTROL THAT ALLOW FOR INCREASING AND DECREASING ILLUMINATION THROUGH SEVEN INTENSITY LEVELS.

## (undated) DEVICE — DRSNG SURESITE WNDW 4X4.5

## (undated) DEVICE — THE STERILE LIGHT HANDLE COVER IS USED WITH STERIS SURGICAL LIGHTING AND VISUALIZATION SYSTEMS.

## (undated) DEVICE — STRIP,CLOSURE,WOUND,MEDI-STRIP,1/2X4: Brand: MEDLINE

## (undated) DEVICE — CVR UNIV C/ARM